# Patient Record
Sex: MALE | Race: WHITE | Employment: OTHER | URBAN - METROPOLITAN AREA
[De-identification: names, ages, dates, MRNs, and addresses within clinical notes are randomized per-mention and may not be internally consistent; named-entity substitution may affect disease eponyms.]

---

## 2017-08-03 ENCOUNTER — APPOINTMENT (OUTPATIENT)
Dept: CT IMAGING | Age: 72
DRG: 065 | End: 2017-08-03
Attending: STUDENT IN AN ORGANIZED HEALTH CARE EDUCATION/TRAINING PROGRAM
Payer: COMMERCIAL

## 2017-08-03 ENCOUNTER — HOSPITAL ENCOUNTER (INPATIENT)
Age: 72
LOS: 6 days | Discharge: NURSING, REHAB OR LONG-TERM CARE | DRG: 065 | End: 2017-08-09
Attending: EMERGENCY MEDICINE | Admitting: INTERNAL MEDICINE
Payer: COMMERCIAL

## 2017-08-03 ENCOUNTER — APPOINTMENT (OUTPATIENT)
Dept: MRI IMAGING | Age: 72
DRG: 065 | End: 2017-08-03
Attending: INTERNAL MEDICINE
Payer: COMMERCIAL

## 2017-08-03 ENCOUNTER — APPOINTMENT (OUTPATIENT)
Dept: CT IMAGING | Age: 72
DRG: 065 | End: 2017-08-03
Attending: EMERGENCY MEDICINE
Payer: COMMERCIAL

## 2017-08-03 DIAGNOSIS — R47.1 DYSARTHRIA: ICD-10-CM

## 2017-08-03 DIAGNOSIS — I63.9 ACUTE CVA (CEREBROVASCULAR ACCIDENT) (HCC): ICD-10-CM

## 2017-08-03 DIAGNOSIS — R29.810 FACIAL DROOP: ICD-10-CM

## 2017-08-03 DIAGNOSIS — I63.9 ACUTE ISCHEMIC STROKE (HCC): Primary | ICD-10-CM

## 2017-08-03 PROBLEM — I10 HYPERTENSION: Status: ACTIVE | Noted: 2017-08-03

## 2017-08-03 PROBLEM — F32.A DEPRESSION: Status: ACTIVE | Noted: 2017-08-03

## 2017-08-03 PROBLEM — G47.30 UNSPECIFIED SLEEP APNEA: Status: ACTIVE | Noted: 2017-08-03

## 2017-08-03 LAB
ANION GAP BLD CALC-SCNC: 6 MMOL/L (ref 5–15)
ATRIAL RATE: 70 BPM
BASOPHILS # BLD AUTO: 0 K/UL (ref 0–0.1)
BASOPHILS # BLD: 1 % (ref 0–1)
BUN SERPL-MCNC: 14 MG/DL (ref 6–20)
BUN/CREAT SERPL: 13 (ref 12–20)
CALCIUM SERPL-MCNC: 9 MG/DL (ref 8.5–10.1)
CALCULATED P AXIS, ECG09: 43 DEGREES
CALCULATED R AXIS, ECG10: -6 DEGREES
CALCULATED T AXIS, ECG11: 48 DEGREES
CHLORIDE SERPL-SCNC: 105 MMOL/L (ref 97–108)
CHOLEST SERPL-MCNC: 211 MG/DL
CO2 SERPL-SCNC: 31 MMOL/L (ref 21–32)
CREAT SERPL-MCNC: 1.12 MG/DL (ref 0.7–1.3)
DIAGNOSIS, 93000: NORMAL
EOSINOPHIL # BLD: 0.2 K/UL (ref 0–0.4)
EOSINOPHIL NFR BLD: 3 % (ref 0–7)
ERYTHROCYTE [DISTWIDTH] IN BLOOD BY AUTOMATED COUNT: 13.3 % (ref 11.5–14.5)
GLUCOSE BLD STRIP.AUTO-MCNC: 189 MG/DL (ref 65–100)
GLUCOSE SERPL-MCNC: 195 MG/DL (ref 65–100)
HCT VFR BLD AUTO: 43.1 % (ref 36.6–50.3)
HDLC SERPL-MCNC: 38 MG/DL
HDLC SERPL: 5.6 {RATIO} (ref 0–5)
HGB BLD-MCNC: 14.4 G/DL (ref 12.1–17)
INR BLD: 1 (ref 0.9–1.2)
LDLC SERPL CALC-MCNC: 110.2 MG/DL (ref 0–100)
LIPID PROFILE,FLP: ABNORMAL
LYMPHOCYTES # BLD AUTO: 35 % (ref 12–49)
LYMPHOCYTES # BLD: 2.1 K/UL (ref 0.8–3.5)
MCH RBC QN AUTO: 30.6 PG (ref 26–34)
MCHC RBC AUTO-ENTMCNC: 33.4 G/DL (ref 30–36.5)
MCV RBC AUTO: 91.5 FL (ref 80–99)
MONOCYTES # BLD: 0.6 K/UL (ref 0–1)
MONOCYTES NFR BLD AUTO: 9 % (ref 5–13)
NEUTS SEG # BLD: 3.2 K/UL (ref 1.8–8)
NEUTS SEG NFR BLD AUTO: 52 % (ref 32–75)
P-R INTERVAL, ECG05: 172 MS
PLATELET # BLD AUTO: 152 K/UL (ref 150–400)
POTASSIUM SERPL-SCNC: 4 MMOL/L (ref 3.5–5.1)
Q-T INTERVAL, ECG07: 404 MS
QRS DURATION, ECG06: 96 MS
QTC CALCULATION (BEZET), ECG08: 436 MS
RBC # BLD AUTO: 4.71 M/UL (ref 4.1–5.7)
SERVICE CMNT-IMP: ABNORMAL
SODIUM SERPL-SCNC: 142 MMOL/L (ref 136–145)
TRIGL SERPL-MCNC: 314 MG/DL (ref ?–150)
VENTRICULAR RATE, ECG03: 70 BPM
VLDLC SERPL CALC-MCNC: 62.8 MG/DL
WBC # BLD AUTO: 6 K/UL (ref 4.1–11.1)

## 2017-08-03 PROCEDURE — 74011250636 HC RX REV CODE- 250/636: Performed by: INTERNAL MEDICINE

## 2017-08-03 PROCEDURE — 93880 EXTRACRANIAL BILAT STUDY: CPT

## 2017-08-03 PROCEDURE — 77030032490 HC SLV COMPR SCD KNE COVD -B

## 2017-08-03 PROCEDURE — 85610 PROTHROMBIN TIME: CPT

## 2017-08-03 PROCEDURE — 74011250637 HC RX REV CODE- 250/637: Performed by: PSYCHIATRY & NEUROLOGY

## 2017-08-03 PROCEDURE — C8929 TTE W OR WO FOL WCON,DOPPLER: HCPCS

## 2017-08-03 PROCEDURE — 65660000000 HC RM CCU STEPDOWN

## 2017-08-03 PROCEDURE — 36415 COLL VENOUS BLD VENIPUNCTURE: CPT | Performed by: STUDENT IN AN ORGANIZED HEALTH CARE EDUCATION/TRAINING PROGRAM

## 2017-08-03 PROCEDURE — 99285 EMERGENCY DEPT VISIT HI MDM: CPT

## 2017-08-03 PROCEDURE — 80048 BASIC METABOLIC PNL TOTAL CA: CPT | Performed by: STUDENT IN AN ORGANIZED HEALTH CARE EDUCATION/TRAINING PROGRAM

## 2017-08-03 PROCEDURE — 74011250637 HC RX REV CODE- 250/637: Performed by: NURSE PRACTITIONER

## 2017-08-03 PROCEDURE — 70496 CT ANGIOGRAPHY HEAD: CPT

## 2017-08-03 PROCEDURE — 70450 CT HEAD/BRAIN W/O DYE: CPT

## 2017-08-03 PROCEDURE — 92610 EVALUATE SWALLOWING FUNCTION: CPT

## 2017-08-03 PROCEDURE — 70544 MR ANGIOGRAPHY HEAD W/O DYE: CPT

## 2017-08-03 PROCEDURE — 80061 LIPID PANEL: CPT | Performed by: STUDENT IN AN ORGANIZED HEALTH CARE EDUCATION/TRAINING PROGRAM

## 2017-08-03 PROCEDURE — 85025 COMPLETE CBC W/AUTO DIFF WBC: CPT | Performed by: STUDENT IN AN ORGANIZED HEALTH CARE EDUCATION/TRAINING PROGRAM

## 2017-08-03 PROCEDURE — 70551 MRI BRAIN STEM W/O DYE: CPT

## 2017-08-03 PROCEDURE — 93005 ELECTROCARDIOGRAM TRACING: CPT

## 2017-08-03 PROCEDURE — 82962 GLUCOSE BLOOD TEST: CPT

## 2017-08-03 PROCEDURE — 74011636320 HC RX REV CODE- 636/320: Performed by: RADIOLOGY

## 2017-08-03 RX ORDER — LABETALOL HYDROCHLORIDE 5 MG/ML
10 INJECTION, SOLUTION INTRAVENOUS
Status: ACTIVE | OUTPATIENT
Start: 2017-08-03 | End: 2017-08-04

## 2017-08-03 RX ORDER — NALOXONE HYDROCHLORIDE 0.4 MG/ML
0.4 INJECTION, SOLUTION INTRAMUSCULAR; INTRAVENOUS; SUBCUTANEOUS AS NEEDED
Status: DISCONTINUED | OUTPATIENT
Start: 2017-08-03 | End: 2017-08-09 | Stop reason: HOSPADM

## 2017-08-03 RX ORDER — SODIUM CHLORIDE 0.9 % (FLUSH) 0.9 %
5-10 SYRINGE (ML) INJECTION EVERY 8 HOURS
Status: DISCONTINUED | OUTPATIENT
Start: 2017-08-03 | End: 2017-08-03 | Stop reason: SDUPTHER

## 2017-08-03 RX ORDER — PRAVASTATIN SODIUM 20 MG/1
20 TABLET ORAL
Status: DISCONTINUED | OUTPATIENT
Start: 2017-08-03 | End: 2017-08-03

## 2017-08-03 RX ORDER — SODIUM CHLORIDE 0.9 % (FLUSH) 0.9 %
5-10 SYRINGE (ML) INJECTION AS NEEDED
Status: DISCONTINUED | OUTPATIENT
Start: 2017-08-03 | End: 2017-08-09 | Stop reason: HOSPADM

## 2017-08-03 RX ORDER — GUAIFENESIN 100 MG/5ML
81 LIQUID (ML) ORAL DAILY
Status: DISCONTINUED | OUTPATIENT
Start: 2017-08-03 | End: 2017-08-03

## 2017-08-03 RX ORDER — FLUTICASONE PROPIONATE 50 MCG
2 SPRAY, SUSPENSION (ML) NASAL DAILY
COMMUNITY

## 2017-08-03 RX ORDER — DM/PE/ACETAMINOPHEN/CHLORPHENR 10-5-325-2
2 TABLET, SEQUENTIAL ORAL DAILY
COMMUNITY
End: 2017-08-09

## 2017-08-03 RX ORDER — ATORVASTATIN CALCIUM 10 MG/1
20 TABLET, FILM COATED ORAL
Status: DISCONTINUED | OUTPATIENT
Start: 2017-08-03 | End: 2017-08-09 | Stop reason: HOSPADM

## 2017-08-03 RX ORDER — GUAIFENESIN 100 MG/5ML
81 LIQUID (ML) ORAL
Status: DISCONTINUED | OUTPATIENT
Start: 2017-08-03 | End: 2017-08-03

## 2017-08-03 RX ORDER — SERTRALINE HYDROCHLORIDE 50 MG/1
100 TABLET, FILM COATED ORAL DAILY
Status: DISCONTINUED | OUTPATIENT
Start: 2017-08-04 | End: 2017-08-09 | Stop reason: HOSPADM

## 2017-08-03 RX ORDER — ASPIRIN 300 MG/1
300 SUPPOSITORY RECTAL ONCE
Status: DISCONTINUED | OUTPATIENT
Start: 2017-08-03 | End: 2017-08-03

## 2017-08-03 RX ORDER — SODIUM CHLORIDE 0.9 % (FLUSH) 0.9 %
5-10 SYRINGE (ML) INJECTION EVERY 8 HOURS
Status: DISCONTINUED | OUTPATIENT
Start: 2017-08-03 | End: 2017-08-09 | Stop reason: HOSPADM

## 2017-08-03 RX ORDER — CHOLECALCIFEROL (VITAMIN D3) 125 MCG
1 CAPSULE ORAL DAILY
COMMUNITY

## 2017-08-03 RX ORDER — ENOXAPARIN SODIUM 100 MG/ML
40 INJECTION SUBCUTANEOUS EVERY 24 HOURS
Status: DISCONTINUED | OUTPATIENT
Start: 2017-08-03 | End: 2017-08-09 | Stop reason: HOSPADM

## 2017-08-03 RX ORDER — VERAPAMIL HYDROCHLORIDE 180 MG/1
180 TABLET, EXTENDED RELEASE ORAL DAILY
Status: DISCONTINUED | OUTPATIENT
Start: 2017-08-04 | End: 2017-08-04

## 2017-08-03 RX ORDER — CLOPIDOGREL BISULFATE 75 MG/1
75 TABLET ORAL DAILY
Status: DISCONTINUED | OUTPATIENT
Start: 2017-08-04 | End: 2017-08-09 | Stop reason: HOSPADM

## 2017-08-03 RX ORDER — SODIUM CHLORIDE 0.9 % (FLUSH) 0.9 %
5-10 SYRINGE (ML) INJECTION AS NEEDED
Status: DISCONTINUED | OUTPATIENT
Start: 2017-08-03 | End: 2017-08-03 | Stop reason: SDUPTHER

## 2017-08-03 RX ADMIN — Medication 10 ML: at 22:00

## 2017-08-03 RX ADMIN — ASPIRIN 81 MG 81 MG: 81 TABLET ORAL at 16:00

## 2017-08-03 RX ADMIN — ENOXAPARIN SODIUM 40 MG: 40 INJECTION SUBCUTANEOUS at 18:00

## 2017-08-03 RX ADMIN — IOPAMIDOL 95 ML: 755 INJECTION, SOLUTION INTRAVENOUS at 09:46

## 2017-08-03 RX ADMIN — ATORVASTATIN CALCIUM 20 MG: 20 TABLET, FILM COATED ORAL at 23:06

## 2017-08-03 NOTE — ED NOTES
TRANSFER - OUT REPORT:    Verbal report given to Latha(name) on Milla Rabago  being transferred to Telemetry(unit) for routine progression of care       Report consisted of patients Situation, Background, Assessment and   Recommendations(SBAR). Information from the following report(s) SBAR, Kardex, ED Summary, [facility] and Recent Results was reviewed with the receiving nurse. Lines:   Peripheral IV 08/03/17 Left Antecubital (Active)   Site Assessment Clean, dry, & intact 8/3/2017  9:42 AM   Phlebitis Assessment 0 8/3/2017  9:42 AM   Infiltration Assessment 0 8/3/2017  9:42 AM   Dressing Status Clean, dry, & intact; New 8/3/2017  9:42 AM   Dressing Type Tape;Transparent 8/3/2017  9:42 AM   Hub Color/Line Status Pink;Capped;Flushed;Patent 8/3/2017  9:42 AM       Peripheral IV 08/03/17 Right Antecubital (Active)   Site Assessment Clean, dry, & intact 8/3/2017  9:42 AM   Phlebitis Assessment 0 8/3/2017  9:42 AM   Infiltration Assessment 0 8/3/2017  9:42 AM   Dressing Status Clean, dry, & intact; New 8/3/2017  9:42 AM   Dressing Type Tape;Transparent 8/3/2017  9:42 AM   Hub Color/Line Status Pink;Capped;Flushed;Patent 8/3/2017  9:42 AM   Action Taken Blood drawn 8/3/2017  9:42 AM        Opportunity for questions and clarification was provided.       Patient transported with:   Monitor  Registered Nurse

## 2017-08-03 NOTE — PROGRESS NOTES
[facility]: MED RECONCILIATION    Comments/Recommendations:   · Verified allergies as documented. · Med rec completed with spouse. · Pharmacy used is [facility].    Medications added:     · Flonase    Medications removed:    · None    Medications adjusted:    · Directions clarified for vitamin D, glucosamine, zoloft    Information obtained from: Rx Query, Spouse, EMR    Allergies: Codeine and Codeine    Prior to Admission Medications:   Prior to Admission Medications   Prescriptions Last Dose Informant Patient Reported? Taking? PLANT STANOL WILNER (CHOLEST OFF PO) 8/3/2017 at Unknown time Significant Other Yes Yes   Sig: Take 1 Tab by mouth daily. aspirin delayed-release 81 mg tablet 8/3/2017 at Unknown time Significant Other Yes Yes   Sig: Take 81 mg by mouth daily. cholecalciferol, vitamin D3, (VITAMIN D3) 2,000 unit tab 8/3/2017 at Unknown time Significant Other Yes Yes   Sig: Take 1 Tab by mouth daily. fluticasone (FLONASE) 50 mcg/actuation nasal spray 8/3/2017 at Unknown time Significant Other Yes Yes   Si Sprays by Both Nostrils route daily. glucosamine (GLUCOSAMINE RELIEF) 1,000 mg tab 8/3/2017 at Unknown time Significant Other Yes Yes   Sig: Take 2 Tabs by mouth daily. sertraline (ZOLOFT) 100 mg tablet 8/3/2017 at Unknown time Significant Other Yes Yes   Sig: Take 100 mg by mouth daily. verapamil CR (VERELAN) 180 mg CR capsule 8/3/2017 at Unknown time Significant Other Yes Yes   Sig: Take 180 mg by mouth daily.         Facility-Administered Medications: None       Thank you,  Rajinder Crews, PharmD     Contact: [phone]

## 2017-08-03 NOTE — PROCEDURES
[address]  *** FINAL REPORT ***    Name: Roxy Redmond  MRN: [id number]    Inpatient  : 1945  HIS Order #: [id number]  [id number] [facility] Visit #: [id number]  Date: 03 Aug 2017    TYPE OF TEST: Cerebrovascular Duplex    REASON FOR TEST  Cerebrovascular accident    Right Carotid:-             Proximal               Mid                 Distal  cm/s  Systolic  Diastolic  Systolic  Diastolic  Systolic  Diastolic  CCA:     48.6       9.5        0.0                 75.8      14.4  Bulb:  ECA:     81.8       9.8  ICA:     34.5       9.1       47.5      12.8       54.1      16.5  ICA/CCA:  0.5       0.6    ICA Stenosis: <50%    Right Vertebral:-  Finding: Antegrade  Sys:       28.4  Munira:        4.9    Right Subclavian:    Left Carotid:-            Proximal                Mid                 Distal  cm/s  Systolic  Diastolic  Systolic  Diastolic  Systolic  Diastolic  CCA:     74.9      12.4                            88.8      13.7  Bulb:     0.0  ECA:    125.0      18.3  ICA:     86.6      18.3       93.0      18.3       49.9      12.8  ICA/CCA:  1.0       1.3    ICA Stenosis: <50%    Left Vertebral:-  Finding: Antegrade  Sys:       51.5  Munira:       11.1    Left Subclavian:    INTERPRETATION/FINDINGS  PROCEDURE:  Evaluation of the extracranial cerebrovascular arteries  with ultrasound (B-mode imaging, pulsed Doppler, color Doppler). Includes the common carotid, internal carotid, external carotid, and  vertebral arteries. FINDINGS: Bilateral intimal thickening noted within the CCA. Calcific  plaque noted within the bulb and  proximal internal carotid arteries. IMPRESSION: Findings are consistent with 0-49% stenosis of the right  internal carotid and 0-49% stenosis of the left internal carotid. Vertebrals are patent with antegrade flow. ADDITIONAL COMMENTS    I have personally reviewed the data relevant to the interpretation of  this  study.     TECHNOLOGIST: Maribeth Stevens RVT  Signed: 2017 12:01 PM    PHYSICIAN: Freddie Ramirez., MD  Signed: 08/04/2017 07:34 AM

## 2017-08-03 NOTE — H&P
[address]  [phone]    Hospitalist Admission Note      NAME:  Damian Jordan   :   [birth year]   MRN:  [id number]     PCP:  Dorita Musa MD     Date/Time:  8/3/2017 1:42 PM          Subjective:     CHIEF COMPLAINT: confusion, slurred speech, dysphagia    HISTORY OF PRESENT ILLNESS:     Mr. Paddy Potter is a 81 y.o. male w/ hx of HTN, TIA who presents with confusion. Woke up this morning at 6am in normal state of health. Went to [employer] and then developed sudden onset of confusion. Forgot why he was there. Found himself stumbling a bit. Drove himself home and wife found him to have slurred speech. Pt also noted to have dysphagia, with trouble swallowing both solids and liquids. Confusion is better as his speech, but feels like he still has trouble swallowing. No weakness or numbness. No facial droop. Pt was evaluated in the ED by teleneurology and decision was made to NOT use tPA per ED attending's verbal report to me. Head CT was negative. CTA H&N showed mild to moderate carotid stenosis and atherosclerotic cerebral vasculopathy. Mr. Paddy Potter is admitted for further evaluation and management of suspected CVA.       Past Medical History:   Diagnosis Date    Family history of skin cancer     Hypertension     Skin cancer     Squamous skin cancer on top of head    Stroke ([address]     TIA    Sun-damaged skin     Sunburn, blistering     Unspecified sleep apnea     cpap        Past Surgical History:   Procedure Laterality Date    HX KNEE ARTHROSCOPY Bilateral     HX ORTHOPAEDIC  2004    TRIGGER FINGER-R HAND    HX ORTHOPAEDIC      finger - left        Social History   Substance Use Topics    Smoking status: Former Smoker    Smokeless tobacco: Never Used    Alcohol use 1.5 oz/week     3 Glasses of wine per week      Comment: Rare        Family History   Problem Relation Age of Onset    Diabetes Mother     Cancer Mother LUNG    Heart Disease Father     Cancer Sister      PANCREATIC    Cancer Sister      BRAIN    Malignant Hyperthermia Neg Hx     Pseudocholinesterase Deficiency Neg Hx     Delayed Awakening Neg Hx     Post-op Nausea/Vomiting Neg Hx     Emergence Delirium Neg Hx     Post-op Cognitive Dysfunction Neg Hx     Other Neg Hx         Allergies   Allergen Reactions    Codeine Nausea Only    Codeine Nausea Only        Prior to Admission medications    Medication Sig Start Date End Date Taking? Authorizing Provider   cholecalciferol, vitamin D3, (VITAMIN D3) 2,000 unit tab Take 1 Tab by mouth daily. Yes Phys Other, MD   glucosamine (GLUCOSAMINE RELIEF) 1,000 mg tab Take 2 Tabs by mouth daily. Yes Phys Other, MD   fluticasone (FLONASE) 50 mcg/actuation nasal spray 2 Sprays by Both Nostrils route daily. Yes Historical Provider   aspirin delayed-release 81 mg tablet Take 81 mg by mouth daily. Yes Historical Provider   sertraline (ZOLOFT) 100 mg tablet Take 100 mg by mouth daily. 3/4/14  Yes Historical Provider   PLANT STANOL WILNER (CHOLEST OFF PO) Take 1 Tab by mouth daily. Yes Historical Provider   verapamil CR (VERELAN) 180 mg CR capsule Take 180 mg by mouth daily.      Yes Historical Provider       Review of Systems:  (bold if positive, if negative)    Gen:  Eyes:  ENT:  [facility]:  Pulm:  GI:    :    MS:  Skin:  Psych:  Endo:    Hem:  Renal:    Neuro:  headache          Objective:      VITALS:    Vital signs reviewed; most recent are:    Visit Vitals    BP (!) 172/96    Pulse 69    Temp 98.2 °F (36.8 °C)    Resp 25    Ht 6' 1\" (1.854 m)    Wt 125.1 kg (275 lb 12.7 oz)    SpO2 93%    BMI 36.39 kg/m2     SpO2 Readings from Last 6 Encounters:   08/03/17 93%   08/06/15 97%   06/15/15 98%   03/15/14 95%   06/05/13 95%   05/29/13 95%        No intake or output data in the 24 hours ending 08/03/17 1342         Exam:     Physical Exam:    Gen:  Well-developed, well-nourished, in no acute distress  HEENT:  No scleral icterus, PERRL, hearing intact to voice, moist mucous membranes  Neck:  Supple, without masses. Thyroid non-tender  Resp:  No accessory muscle use. CTAB without wheezing, rales, rhonchi  Card: RRR. Normal S1 and S2 without murmurs, rubs, or gallops. No peripheral lower extremity edema. No JVD. Peripheral pulses in tact. Abd:  Normoactive bowel sounds. Soft, non-tender, non-distended. No rebound, no guarding. No appreciable hepatosplenomegaly   Lymph:  No cervical adenopathy  Musc:  No cyanosis or clubbing  Skin:  No rashes or ulcers; turgor intact. Cap refill ~2 secs  Neuro:  Mild dysarthria otherwise cranial nerves 3-12 intact, no focal motor weakness with 5/5 strength BUE and BLEs, follows commands appropriately  Psych:  Good insight, normal affect. Alert, oriented x 3. Answers questions appropriately       Labs:    Recent Labs      08/03/17   0950   WBC  6.0   HGB  14.4   HCT  43.1   PLT  152     Recent Labs      08/03/17   0950   NA  142   K  4.0   CL  105   CO2  31   GLU  195*   BUN  14   CREA  1.12   CA  9.0     No components found for: GLPOC  No results for input(s): PH, PCO2, PO2, HCO3, FIO2 in the last 72 hours. Recent Labs      08/03/17   0942   INR  1.0     No results found for: SDES  No results found for: CULT  All other current labs reviewed in the computer.       Imaging/Studies:    CTA as above    EKG: NSR, LVH  EKG personally reviewed         Assessment / Plan:       81 y.o. male with hx of HTN, TIA who presents with confusion, dysarthria, and dysphagia, admitted for suspected CVA     Acute CVA (cerebrovascular accident): with presenting symptoms of confusion, dysarthria, and dysphagia, worrisome for acute CVA  -- MRI brain, TTE, carotid dopplers  -- ASA AR today, change to Plavix tomorrow (was on ASA already at home)  -- high intensity statin with atorvastatin, check lipid panel  -- send A1c to further risk stratify  -- frequent neuro checks   -- treat HTN only if extreme (SBP>220 or DBP>120 or if pt has another clear indication, e.g. AS, heart failure, aortic dissection, hypertensive encephalopathy). If treating, would slowly decrease BP by ~15% during the first 24h after stroke onset.  Can treat with IV labetalol or nicardipine  -- PT / OT / Speech evaluation   -- neurology consult      Unspecified sleep apnea (8/3/2017)  -- CPAP qhs      Hypertension: accelerated HTN, up to 220/100s in ED  -- BP goals as above  -- cont verapamil      Depression  -- cont Zoloft    Code Status: FULL, d/w wife who is a RN at [address] [place] [address]     Surrogate decision maker: wife      Previous notes and lab results reviewed, including ED notes      Total time spent with patient: 79 Minutes     Risk of deterioration: High                 Care Plan discussed with: ED provider, Patient, Family, Nursing Staff and Consultant/Specialist    Discussed:  Code Status, Care Plan and D/C Planning    Prophylaxis:  Lovenox    Disposition:  Home w/Family       ___________________________________________________    Attending Physician: Lidya Catalan MD

## 2017-08-03 NOTE — ED TRIAGE NOTES
Pt reports at 0830 this morning he starting \"having a fog\" and then having trouble speaking and trouble swallowing. CODE S called in triage.

## 2017-08-03 NOTE — ED NOTES
Pt passed initial dyspagia screening; however, now reports an increase in the difficulty managing secretions. Dr. Daphne Whitaker made aware. Will keep pt NPO at this time.

## 2017-08-03 NOTE — PROGRESS NOTES
Speech LAnguage Pathology bedside swallow evaluation  Patient: Apryl Martinez (53 y.o. male)  Date: 8/3/2017  Primary Diagnosis: Acute CVA (cerebrovascular accident) [facility])        Precautions: aspiration       ASSESSMENT :  Based on the objective data described below, the patient presents with mild oral and moderate pharyngeal dysphagia. At this moment, he is being cautious and careful with PO. Will try a very modified, light diet and f/u in am.    He was admitted with CVA with c/o speech and swallowing. Dysarthria appears to be mild flaccid dysarthria, he is independently using a slow, exaggerated rate. . HE has mild expressive aphasia at this time as well. Patient will benefit from skilled intervention to address the above impairments. Patients rehabilitation potential is considered to be Fair  Factors which may influence rehabilitation potential include:   []            None noted  [x]            Mental ability/status  [x]            Medical condition  []            Home/family situation and support systems  []            Safety awareness  []            Pain tolerance/management  []            Other:      PLAN :  Recommendations and Planned Interventions:  Full liquid diet. Eat slowly, carefully in small amounts. Frequency/Duration: Patient will be followed by speech-language pathology 5 times a week to address goals. Discharge Recommendations: Inpatient Rehab     SUBJECTIVE:   Patient stated I was having trouble swallowing a roll this am and my coffeee. -c/o oral leakage and some pharyngeal dysphagia.     OBJECTIVE:-     Past Medical History:   Diagnosis Date    Family history of skin cancer     Hypertension     Skin cancer     Squamous skin cancer on top of head    Stroke ([facility] [address] 2008    TIA    Sun-damaged skin     Sunburn, blistering     Unspecified sleep apnea     cpap     Past Surgical History:   Procedure Laterality Date    HX KNEE ARTHROSCOPY Bilateral 2011    HX ORTHOPAEDIC  2004 TRIGGER FINGER-R HAND    HX ORTHOPAEDIC      finger - left      Prior Level of Function/Home Situation:      Diet prior to admission: regular, thins  Current Diet:  NPO   Cognitive and Communication Status:  Neurologic State: Alert  Orientation Level: Oriented X4  Cognition: Follows commands (he had good safety awareness and caution about swallowing b/c he was fearful of choking; noted mild expressive aphasia with word-retireval delays, incomplete sentences at times)  Perception: Appears intact  Perseveration: No perseveration noted  Safety/Judgement: Decreased insight into deficits  Oral Assessment:  Oral Assessment  Labial: Right droop  Dentition: Natural  Oral Hygiene: WFL  Lingual: No impairment  Mandible: No impairment  P.O. Trials:  Patient Position: upright in bed  Vocal quality prior to P.O.:  (mild-moderate dysarthria, characterzied by slow, effortful swallow. intelligiblity was good, but noted mild distortion.  )  Consistency Presented: Thin liquid; Ice chips; Solid;Puree  How Presented: Self-fed/presented;Spoon;Straw;Cup/sip   ORAL PHASE:   Bolus Acceptance: Impaired (very slow and cautious)  Bolus Formation/Control:  (slow)  Type of Impairment: Mastication  Propulsion: Delayed (# of seconds) (cautious)  Oral Residue: None   PHARYNGEAL PHASE:   Initiation of Swallow: Delayed (# of seconds) (mild incoordination)  Laryngeal Elevation: Weak (moderate)  Aspiration Signs/Symptoms: Change vocal quality;Weak cough (after drinking with food in the mouth) BUT not with thin liquids by themselves. At this time, he appears capable of swallowing small sips and bites carefully. NOMS:   The NOMS functional outcome measure was used to quantify this patient's level of swallowing impairment. Based on the NOMS, the patient was determined to be at level 2 for swallow function     G Codes:   In compliance with CMSs Claims Based Outcome Reporting, the following G-code set was chosen for this patient based the use of the NOMS functional outcome to quantify this patient's level of swallowing impairment. Using the NOMS, the patient was determined to be at level 2 for swallow function which correlates with the CM= 80-99% level of severity. Based on the objective assessment provided within this note, the current, goal, and discharge g-codes are as follows:    Swallow  Swallowing:   Swallow Current Status CM= 80-99%   Swallow Goal Status CK= 40-59%        NOMS Swallowing Levels:  Level 1 (CN): NPO  Level 2 (CM): NPO but takes consistency in therapy  Level 3 (CL): Takes less than 50% of nutrition p.o. and continues with nonoral feedings; and/or safe with mod cues; and/or max diet restriction  Level 4 (CK): Safe swallow but needs mod cues; and/or mod diet restriction; and/or still requires some nonoral feeding/supplements  Level 5 (CJ): Safe swallow with min diet restriction; and/or needs min cues  Level 6 (CI): Independent with p.o.; rare cues; usually self cues; may need to avoid some foods or needs extra time  Level 7 ([address] [place] [address]): Independent for all p.o.  BAHMAN. (2003). National Outcomes Measurement System (NOMS): Adult Speech-Language Pathology User's Guide. Pain:Pain Scale 1: Numeric (0 - 10)  Pain Intensity 1: 0     After treatment:   []            Patient left in no apparent distress sitting up in chair  []            Patient left in no apparent distress in bed  []            Call bell left within reach  []            Nursing notified  []            Caregiver present  []            Bed alarm activated    COMMUNICATION/EDUCATION:   The patients plan of care including recommendations, planned interventions, and recommended diet changes were discussed with: Registered Nurse and Neuro NP. Patient was educated regarding his deficit(s) of dysphagia as this relates to His diagnosis of CVA. He demonstrated Good understanding as evidenced by discussion. Wife understood as well.  .  []            Posted safety precautions in patient's room. [x]            Patient/family have participated as able in goal setting and plan of care. [x]            Patient/family agree to work toward stated goals and plan of care. []            Patient understands intent and goals of therapy, but is neutral about his/her participation. []            Patient is unable to participate in goal setting and plan of care.     Thank you for this referral.  Charlotte Danielson SLP  Time Calculation: 15 mins

## 2017-08-03 NOTE — IP AVS SNAPSHOT
[place] [phone] Patient: Pepe Boykin MRN: [id number] You are allergic to the following Allergen Reactions Codeine Nausea Only Codeine Nausea Only Recent Documentation Height Weight BMI Smoking Status 1.854 m 128.8 kg 37.46 kg/m2 Former Smoker Emergency Contacts Name Discharge Info Relation Home Work Mobile [employer] CAREGIVER [3] Spouse [3] [phone] About your hospitalization You were admitted on:  August 3, 2017 You last received care in the:  [facility]  MED SURG 2 You were discharged on:  August 9, 2017 Unit phone number:  [phone] Why you were hospitalized Your primary diagnosis was:  Not on File Your diagnoses also included:  Acute Cva (Cerebrovascular Accident) (Hcc), Unspecified Sleep Apnea, Hypertension, Depression, Type Ii Diabetes Mellitus, Uncontrolled (Hcc) Providers Seen During Your Hospitalizations Provider Role Specialty Primary office phone Keanu Chavez MD Attending Provider Emergency Medicine [phone] Mary May MD Attending Provider Internal Medicine [phone] Dena Patrick MD Attending Provider Internal Medicine [phone] Your Primary Care Physician (PCP) Primary Care Physician Office Phone Office Fax 50 [facility] 61 [phone] Follow-up Information Follow up With Details Comments Contact Info Du Reyez MD In 5 days  [address] [facility] [address] [facility] [id number] 
[phone] Brandin Nicolas NP Go on 8/22/2017 Neurology hospital follow-up post Stroke:  Arrival time: 0930 for 10am appt [place] [address] [address] [place] [id number] 
[phone] Your Appointments Tuesday August 22, 2017 10:00 AM EDT Any with Brandin Nicolas NP  
 [address] [facility] ([address] [facility]) [place] [facility] [address] [facility] [place] [address] [phone] Current Discharge Medication List  
  
START taking these medications Dose & Instructions Dispensing Information Comments Morning Noon Evening Bedtime  
 atorvastatin 20 mg tablet Commonly known as:  LIPITOR Your last dose was: Your next dose is:    
   
   
 Dose:  20 mg Take 1 Tab by mouth nightly. Quantity:  30 Tab Refills:  0  
     
   
   
   
  
 clopidogrel 75 mg Tab Commonly known as:  PLAVIX Your last dose was: Your next dose is:    
   
   
 Dose:  75 mg Take 1 Tab by mouth daily. Quantity:  30 Tab Refills:  0  
     
   
   
   
  
 glipiZIDE SR 2.5 mg CR tablet Commonly known as:  GLUCOTROL XL Your last dose was: Your next dose is:    
   
   
 Dose:  2.5 mg Take 1 Tab by mouth Daily (before breakfast). Quantity:  30 Tab Refills:  0  
     
   
   
   
  
 hydroCHLOROthiazide 12.5 mg tablet Commonly known as:  HYDRODIURIL Your last dose was: Your next dose is:    
   
   
 Dose:  12.5 mg Take 1 Tab by mouth daily. Quantity:  30 Tab Refills:  0  
     
   
   
   
  
 insulin lispro 100 unit/mL injection Commonly known as:  HUMALOG Your last dose was: Your next dose is:    
   
   
 Sliding scale. Quantity:  1 Vial  
Refills:  0  
     
   
   
   
  
 lisinopril 20 mg tablet Commonly known as:  Agnes Drought Your last dose was: Your next dose is:    
   
   
 Dose:  20 mg Take 1 Tab by mouth daily. Quantity:  30 Tab Refills:  0  
     
   
   
   
  
 melatonin 3 mg tablet Your last dose was: Your next dose is:    
   
   
 Dose:  3 mg Take 1 Tab by mouth nightly as needed. Quantity:  30 Tab Refills:  0 CONTINUE these medications which have CHANGED Dose & Instructions Dispensing Information Comments Morning Noon Evening Bedtime * verapamil  mg CR tablet Commonly known as:  CALAN-SR What changed: You were already taking a medication with the same name, and this prescription was added. Make sure you understand how and when to take each. Your last dose was: Your next dose is:    
   
   
 Dose:  240 mg Take 1 Tab by mouth daily. Quantity:  30 Tab Refills:  0  
     
   
   
   
  
 * verapamil  mg CR capsule Commonly known as:  Carina Villegas What changed:  Another medication with the same name was added. Make sure you understand how and when to take each. Your last dose was: Your next dose is:    
   
   
 Dose:  180 mg Take 180 mg by mouth daily. Refills:  0  
     
   
   
   
  
 * Notice: This list has 2 medication(s) that are the same as other medications prescribed for you. Read the directions carefully, and ask your doctor or other care provider to review them with you. CONTINUE these medications which have NOT CHANGED Dose & Instructions Dispensing Information Comments Morning Noon Evening Bedtime  
 aspirin delayed-release 81 mg tablet Your last dose was: Your next dose is:    
   
   
 Dose:  81 mg Take 81 mg by mouth daily. Refills:  0  
     
   
   
   
  
 FLONASE 50 mcg/actuation nasal spray Generic drug:  fluticasone Your last dose was: Your next dose is:    
   
   
 Dose:  2 Spray 2 Sprays by Both Nostrils route daily. Refills:  0  
     
   
   
   
  
 sertraline 100 mg tablet Commonly known as:  ZOLOFT Your last dose was: Your next dose is:    
   
   
 Dose:  100 mg Take 100 mg by mouth daily. Refills:  0  
     
   
   
   
  
 VITAMIN D3 2,000 unit Tab Generic drug:  cholecalciferol (vitamin D3) Your last dose was: Your next dose is:    
   
   
 Dose:  1 Tab Take 1 Tab by mouth daily. Refills:  0 STOP taking these medications CHOLEST OFF PO  
   
  
 GLUCOSAMINE RELIEF 1,000 mg Tab Generic drug:  glucosamine Where to Get Your Medications Information on where to get these meds will be given to you by the nurse or doctor. ! Ask your nurse or doctor about these medications  
  atorvastatin 20 mg tablet  
 clopidogrel 75 mg Tab  
 glipiZIDE SR 2.5 mg CR tablet  
 hydroCHLOROthiazide 12.5 mg tablet  
 insulin lispro 100 unit/mL injection  
 lisinopril 20 mg tablet  
 melatonin 3 mg tablet  
 verapamil  mg CR tablet Discharge Instructions HOSPITALIST DISCHARGE INSTRUCTIONS 
NAME: Lacy Sauceda :  1945 MRN:  [id number] Date/Time:  2017 7:34 AM 
 
ADMIT DATE: 8/3/2017 DISCHARGE DATE: 2017 PRINCIPAL DISCHARGE DIAGNOSES: 
stroke MEDICATIONS: 
· It is important that you take the medication exactly as they are prescribed. Note the changes and additions to your medications. Be sure you understand these changes before you are discharged today. · Keep your medication in the bottles provided by the pharmacist and keep a list of the medication names, dosages, and times to be taken in your wallet. · Do not take other medications without consulting your doctor. Pain Management: per above medications What to do at [facility] Recommended diet:  Cardiac Diet Recommended activity: Activity as tolerated If you experience any of the following symptoms then please call your primary care physician or return to the emergency room if you cannot get hold of your doctor: 
 
severe headache, dizziness, facial droop, slurred speech, trouble swallowing, confusion, weakness/numbness, or other severe concerning symptoms that brought you to the hospital in the first place Follow Up: Follow-up Information Follow up With Details Comments Contact Info Juliana Gilliland MD In 5 days  [address] [facility] [address] [place] [address] 
[phone] [address] In 2 weeks  [place] [address] Information obtained by : 
I understand that if any problems occur once I am at home I am to contact my physician. I understand and acknowledge receipt of the instructions indicated above. Physician's or R.N.'s Signature                                                                  Date/Time Patient or Representative Signature                                                          Date/Time Discharge Orders None [employer] Announcement We are excited to announce that we are making your provider's discharge notes available to you in [employer]. You will see these notes when they are completed and signed by the physician that discharged you from your recent hospital stay. If you have any questions or concerns about any information you see in [employer], please call the Health Information Department where you were seen or reach out to your Primary Care Provider for more information about your plan of care. Introducing [facility]! Haleigh Dyer introduces [employer] patient portal. Now you can access parts of your medical record, email your doctor's office, and request medication refills online. 1. In your internet browser, go to [email or web address] 2. Click on the First Time User? Click Here link in the Sign In box. You will see the New Member Sign Up page. 3. Enter your [employer] Access Code exactly as it appears below.  You will not need to use this code after youve completed the sign-up process. If you do not sign up before the expiration date, you must request a new code. · [employer] Access Code: [email or web address] Expires: 11/2/2017  8:50 AM 
 
4. Enter the last four digits of your Social Security Number (xxxx) and Date of Birth (mm/dd/yyyy) as indicated and click Submit. You will be taken to the next sign-up page. 5. Create a [employer] ID. This will be your [employer] login ID and cannot be changed, so think of one that is secure and easy to remember. 6. Create a [employer] password. You can change your password at any time. 7. Enter your Password Reset Question and Answer. This can be used at a later time if you forget your password. 8. Enter your e-mail address. You will receive e-mail notification when new information is available in [address] 9. Click Sign Up. You can now view and download portions of your medical record. 10. Click the Download Summary menu link to download a portable copy of your medical information. If you have questions, please visit the Frequently Asked Questions section of the [employer] website. Remember, [employer] is NOT to be used for urgent needs. For medical emergencies, dial 911. Now available from your iPhone and Android! General Information Please provide this summary of care documentation to your next provider. Patient Signature:  ____________________________________________________________ Date:  ____________________________________________________________  
  
Larri Hazard Provider Signature:  ____________________________________________________________ Date:  ____________________________________________________________
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
## 2017-08-03 NOTE — ED PROVIDER NOTES
Patient is a 81 y.o. male presenting with difficulty swallowing. Dysphagia         74y M here with dysphagia and trouble swallowing. Last seen normal at 6:30a by wife. Pt reports that around 8:30a he felt like something was \"off\" - was having trouble with his speech and trouble walking. Felt like he was in a fog. Was able to drive himself home. His wife noticed that he didn't seem like himself so brought him here for evaluation. No hx of similar. Pt denies any weakness or numbness. No chest pain. No nausea or vomiting. No abdominal pain. No HA. No vision changes. Code stroke called on arrival. Pt seen and examined while on CT table. Past Medical History:   Diagnosis Date    Family history of skin cancer     Hypertension     Skin cancer     Squamous skin cancer on top of head    Stroke ([facility] [address] 2008    TIA    Sun-damaged skin     Sunburn, blistering     Unspecified sleep apnea     cpap       Past Surgical History:   Procedure Laterality Date    HX KNEE ARTHROSCOPY Bilateral 2011    HX ORTHOPAEDIC  2004    TRIGGER FINGER-R HAND    HX ORTHOPAEDIC      finger - left          Family History:   Problem Relation Age of Onset    Malignant Hyperthermia Neg Hx     Pseudocholinesterase Deficiency Neg Hx     Delayed Awakening Neg Hx     Post-op Nausea/Vomiting Neg Hx     Emergence Delirium Neg Hx     Post-op Cognitive Dysfunction Neg Hx     Other Neg Hx     Diabetes Mother     Cancer Mother      LUNG    Heart Disease Father     Cancer Sister      PANCREATIC    Cancer Sister      BRAIN       Social History     Social History    Marital status:      Spouse name: N/A    Number of children: N/A    Years of education: N/A     Occupational History    Not on file.      Social History Main Topics    Smoking status: Never Smoker    Smokeless tobacco: Never Used    Alcohol use 1.5 oz/week     3 Glasses of wine per week      Comment: Rare    Drug use: No    Sexual activity: Not on file     Other Topics Concern    Not on file     Social History Narrative    ** Merged History Encounter **              ALLERGIES: Codeine and Codeine    Review of Systems   Review of Systems   Constitutional: (-) weight loss. HEENT: (-) stiff neck   Eyes: (-) discharge. Respiratory: (-) for cough. Cardiovascular: (-) syncope. Gastrointestinal: (-) blood in stool. Genitourinary: (-) hematuria. Musculoskeletal: (-) myalgias. Neurological: (-) seizure. Skin: (-) petechiae  Lymph/Immunologic: (-) enlarged lymph nodes  All other systems reviewed and are negative. Vitals:    08/03/17 0931   Weight: 125.1 kg (275 lb 12.7 oz)            Physical Exam Nursing note and vitals reviewed. Constitutional: oriented to person, place, and time. appears well-developed and well-nourished. No distress. Head: Normocephalic and atraumatic. Sclera anicteric  Nose: No rhinorrhea  Mouth/Throat: Oropharynx is clear and moist. Pharynx normal  Eyes: Conjunctivae are normal. Pupils are equal, round, and reactive to light. Right eye exhibits no discharge. Left eye exhibits no discharge. Neck: Painless normal range of motion. Neck supple. No LAD. Cardiovascular: Normal rate, regular rhythm, normal heart sounds and intact distal pulses. Exam reveals no gallop and no friction rub. No murmur heard. Pulmonary/Chest:  No respiratory distress. No wheezes. No rales. No rhonchi. No increased work of breathing. No accessory muscle use. Good air exchange throughout. Abdominal: soft, non-tender, no rebound or guarding. No hepatosplenomegaly. Normal bowel sounds throughout. Back: no tenderness to palpation, no deformities, no CVA tenderness  Extremities/Musculoskeletal: Normal range of motion. no tenderness. No edema. Distal extremities are neurovasc intact. Lymphadenopathy:   No adenopathy.    Neurological:  CN II-XII intact aside from R sided facial droop, 5/5 strength throughout, nl sensation throughout, nl fluency, slurred speech,  no pronator drift. Normal mental status. Skin: Skin is warm and dry. No rash noted. No pallor. MDM 74y M here with stroke-like sx's. Will need CT, labs, and admission. Will d/w neuro.     ED Course       Procedures

## 2017-08-03 NOTE — ED NOTES
Dr. Dominguez Benitezr, at bedside discussing plan of care options including possibility of tPA to pt and wife.

## 2017-08-03 NOTE — PROGRESS NOTES
Spiritual Care Assessment/Progress Notes    He Guardado [id number]  [id number]    1945  81 y.o.  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   []         Not able to assess due to medical condition    Services Provided Today:  []         crisis intervention    []         reading Scriptures  []         spiritual assessment    []         prayer  [x]         empathic listening/emotional support  []         rites and rituals (cite in comments)  []         life review     []         Samaritan support  []         theological development   []         advocacy  []         ethical dialog     []         blessing  []         bereavement support    [x]         support to family  []         anticipatory grief support   []         help with AMD  []         spiritual guidance    []         meditation      Spiritual Care Needs  [x]         Emotional Support  [] Spiritual/Holiness Care  []         Loss/Adjustment  []         Advocacy/Referral                /Ethics  []         No needs expressed at               this time  []         Other: (note in               comments)  Spiritual Care Plan  []         Follow up visits with               pt/family  []         Provide materials  []         Schedule sacraments  []         Contact Community               Clergy  [x]         Follow up as needed  []         Other: (note in               comments)     Comments:  responded to Code S in ER 13. Pt was out of room at the time. Pt's wife was present by bedside.  provided initial support and stepped out for pt & tele-specialist consultation. Spiritual care will continue to follow for support.      [address] Ames M.Div, M.S, Didi Stewart1 available at 5[facility] [[email or web address]]([email or web address])

## 2017-08-03 NOTE — PROGRESS NOTES
1407: Pt arrived to unit. VSS. Remote tele monitoring. Oriented to room & educated on fall safety- pt & wife at bedside verbalized understanding. Primary Nurse Rubén Willson RN and David Barrrea RN performed a dual skin assessment on this patient. No impairment noted. Pt did have \"cupping\" on his back- ecchymosis present + small scratch to R FA. Luke score is 20. Stroke Education provided to patient and spouse/SO and the following topics were discussed    1. Patients personal risk factors for stroke are hypertension, hyperlipidemia and sleep apnea screen    2. Warning signs of Stroke:        * Sudden numbness or weakness of the face, arm or leg, especially on one side of          The body            * Sudden confusion, trouble speaking or understanding        * Sudden trouble seeing in one or both eyes        * Sudden trouble walking, dizziness, loss of balance or coordination        * Sudden severe headache with no known cause      3. Importance of activation Emergency Medical Services ( 9-1-1 ) immediately if experience any warning signs of stroke. 4. Be sure and schedule a follow-up appointment with your primary care doctor or any specialists as instructed. 5. You must take medicine every day to treat your risk factors for stroke. Be sure to take your medicines exactly as your doctor tells you: no more, no less. Know what your medicines are for , what they do. Anti-thrombotics /anticoagulants can help prevent strokes. You are taking the following medicine(s)  ASA/Lovenox     6. Smoking and second-hand smoke greatly increase your risk of stroke, cardiovascular disease and death. Smoking history never    7. Information provided was [facility] Stroke Education Hugo Guardado or Verbal Education    8. Documentation of teaching completed in Patient Education Activity and on Care Plan with teaching response noted?   yes

## 2017-08-03 NOTE — ED NOTES
ED EKG interpretation:  Rhythm: normal sinus rhythm; and regular . Rate (approx.): 70; Axis: normal; P wave: normal; QRS interval: normal ; ST/T wave: normal;  This EKG was interpreted by Gracie Hand MD,ED Provider.

## 2017-08-03 NOTE — PROGRESS NOTES
Occupational Therapy  Order received and chart reviewed, attempted to see for evaluation however currently having MRI, will follow up as able.    Jun Kerr OTR/L

## 2017-08-04 PROBLEM — E11.9 DIABETES MELLITUS TYPE 2, DIET-CONTROLLED (HCC): Status: ACTIVE | Noted: 2017-08-04

## 2017-08-04 LAB
ALBUMIN SERPL BCP-MCNC: 3.9 G/DL (ref 3.5–5)
ALBUMIN/GLOB SERPL: 1.1 {RATIO} (ref 1.1–2.2)
ALP SERPL-CCNC: 68 U/L (ref 45–117)
ALT SERPL-CCNC: 74 U/L (ref 12–78)
ANION GAP BLD CALC-SCNC: 12 MMOL/L (ref 5–15)
AST SERPL W P-5'-P-CCNC: 34 U/L (ref 15–37)
BASOPHILS # BLD AUTO: 0 K/UL (ref 0–0.1)
BASOPHILS # BLD: 1 % (ref 0–1)
BILIRUB SERPL-MCNC: 0.6 MG/DL (ref 0.2–1)
BUN SERPL-MCNC: 11 MG/DL (ref 6–20)
BUN/CREAT SERPL: 11 (ref 12–20)
CALCIUM SERPL-MCNC: 8.8 MG/DL (ref 8.5–10.1)
CHLORIDE SERPL-SCNC: 104 MMOL/L (ref 97–108)
CHOLEST SERPL-MCNC: 217 MG/DL
CO2 SERPL-SCNC: 25 MMOL/L (ref 21–32)
CREAT SERPL-MCNC: 0.99 MG/DL (ref 0.7–1.3)
EOSINOPHIL # BLD: 0.2 K/UL (ref 0–0.4)
EOSINOPHIL NFR BLD: 3 % (ref 0–7)
ERYTHROCYTE [DISTWIDTH] IN BLOOD BY AUTOMATED COUNT: 13.3 % (ref 11.5–14.5)
EST. AVERAGE GLUCOSE BLD GHB EST-MCNC: 203 MG/DL
GLOBULIN SER CALC-MCNC: 3.5 G/DL (ref 2–4)
GLUCOSE BLD STRIP.AUTO-MCNC: 147 MG/DL (ref 65–100)
GLUCOSE BLD STRIP.AUTO-MCNC: 169 MG/DL (ref 65–100)
GLUCOSE BLD STRIP.AUTO-MCNC: 179 MG/DL (ref 65–100)
GLUCOSE SERPL-MCNC: 163 MG/DL (ref 65–100)
HBA1C MFR BLD: 8.7 % (ref 4.2–6.3)
HCT VFR BLD AUTO: 44.2 % (ref 36.6–50.3)
HDLC SERPL-MCNC: 41 MG/DL
HDLC SERPL: 5.3 {RATIO} (ref 0–5)
HGB BLD-MCNC: 14.8 G/DL (ref 12.1–17)
LDLC SERPL CALC-MCNC: 128.6 MG/DL (ref 0–100)
LIPID PROFILE,FLP: ABNORMAL
LYMPHOCYTES # BLD AUTO: 37 % (ref 12–49)
LYMPHOCYTES # BLD: 2.3 K/UL (ref 0.8–3.5)
MAGNESIUM SERPL-MCNC: 2.4 MG/DL (ref 1.6–2.4)
MCH RBC QN AUTO: 30.2 PG (ref 26–34)
MCHC RBC AUTO-ENTMCNC: 33.5 G/DL (ref 30–36.5)
MCV RBC AUTO: 90.2 FL (ref 80–99)
MONOCYTES # BLD: 0.5 K/UL (ref 0–1)
MONOCYTES NFR BLD AUTO: 8 % (ref 5–13)
NEUTS SEG # BLD: 3.2 K/UL (ref 1.8–8)
NEUTS SEG NFR BLD AUTO: 51 % (ref 32–75)
PLATELET # BLD AUTO: 155 K/UL (ref 150–400)
POTASSIUM SERPL-SCNC: 3.6 MMOL/L (ref 3.5–5.1)
PROT SERPL-MCNC: 7.4 G/DL (ref 6.4–8.2)
RBC # BLD AUTO: 4.9 M/UL (ref 4.1–5.7)
SERVICE CMNT-IMP: ABNORMAL
SODIUM SERPL-SCNC: 141 MMOL/L (ref 136–145)
TRIGL SERPL-MCNC: 237 MG/DL (ref ?–150)
VLDLC SERPL CALC-MCNC: 47.4 MG/DL
WBC # BLD AUTO: 6.3 K/UL (ref 4.1–11.1)

## 2017-08-04 PROCEDURE — 82962 GLUCOSE BLOOD TEST: CPT

## 2017-08-04 PROCEDURE — 74011636637 HC RX REV CODE- 636/637: Performed by: INTERNAL MEDICINE

## 2017-08-04 PROCEDURE — 97162 PT EVAL MOD COMPLEX 30 MIN: CPT

## 2017-08-04 PROCEDURE — 74011250637 HC RX REV CODE- 250/637: Performed by: INTERNAL MEDICINE

## 2017-08-04 PROCEDURE — 83036 HEMOGLOBIN GLYCOSYLATED A1C: CPT | Performed by: INTERNAL MEDICINE

## 2017-08-04 PROCEDURE — 80061 LIPID PANEL: CPT | Performed by: INTERNAL MEDICINE

## 2017-08-04 PROCEDURE — 97112 NEUROMUSCULAR REEDUCATION: CPT

## 2017-08-04 PROCEDURE — 74011250637 HC RX REV CODE- 250/637: Performed by: PSYCHIATRY & NEUROLOGY

## 2017-08-04 PROCEDURE — 85025 COMPLETE CBC W/AUTO DIFF WBC: CPT | Performed by: INTERNAL MEDICINE

## 2017-08-04 PROCEDURE — 92526 ORAL FUNCTION THERAPY: CPT | Performed by: SPEECH-LANGUAGE PATHOLOGIST

## 2017-08-04 PROCEDURE — 83735 ASSAY OF MAGNESIUM: CPT | Performed by: INTERNAL MEDICINE

## 2017-08-04 PROCEDURE — 65660000000 HC RM CCU STEPDOWN

## 2017-08-04 PROCEDURE — 74011250636 HC RX REV CODE- 250/636: Performed by: INTERNAL MEDICINE

## 2017-08-04 PROCEDURE — 97535 SELF CARE MNGMENT TRAINING: CPT

## 2017-08-04 PROCEDURE — 97530 THERAPEUTIC ACTIVITIES: CPT

## 2017-08-04 PROCEDURE — 97165 OT EVAL LOW COMPLEX 30 MIN: CPT

## 2017-08-04 PROCEDURE — 80053 COMPREHEN METABOLIC PANEL: CPT | Performed by: INTERNAL MEDICINE

## 2017-08-04 PROCEDURE — 36415 COLL VENOUS BLD VENIPUNCTURE: CPT | Performed by: INTERNAL MEDICINE

## 2017-08-04 RX ORDER — HYDRALAZINE HYDROCHLORIDE 20 MG/ML
10 INJECTION INTRAMUSCULAR; INTRAVENOUS
Status: DISCONTINUED | OUTPATIENT
Start: 2017-08-04 | End: 2017-08-09 | Stop reason: HOSPADM

## 2017-08-04 RX ORDER — VERAPAMIL HYDROCHLORIDE 240 MG/1
240 TABLET, FILM COATED, EXTENDED RELEASE ORAL DAILY
Status: DISCONTINUED | OUTPATIENT
Start: 2017-08-05 | End: 2017-08-09 | Stop reason: HOSPADM

## 2017-08-04 RX ORDER — ATORVASTATIN CALCIUM 20 MG/1
20 TABLET, FILM COATED ORAL
Qty: 30 TAB | Refills: 0 | Status: SHIPPED | OUTPATIENT
Start: 2017-08-04 | End: 2017-08-09

## 2017-08-04 RX ORDER — HYDROCHLOROTHIAZIDE 25 MG/1
12.5 TABLET ORAL DAILY
Status: DISCONTINUED | OUTPATIENT
Start: 2017-08-04 | End: 2017-08-09 | Stop reason: HOSPADM

## 2017-08-04 RX ORDER — GLIPIZIDE 2.5 MG/1
2.5 TABLET, EXTENDED RELEASE ORAL
Status: DISCONTINUED | OUTPATIENT
Start: 2017-08-04 | End: 2017-08-09 | Stop reason: HOSPADM

## 2017-08-04 RX ORDER — INSULIN LISPRO 100 [IU]/ML
INJECTION, SOLUTION INTRAVENOUS; SUBCUTANEOUS
Status: DISCONTINUED | OUTPATIENT
Start: 2017-08-04 | End: 2017-08-09 | Stop reason: HOSPADM

## 2017-08-04 RX ORDER — HYDROCHLOROTHIAZIDE 12.5 MG/1
12.5 TABLET ORAL DAILY
Qty: 30 TAB | Refills: 0 | Status: SHIPPED | OUTPATIENT
Start: 2017-08-04 | End: 2017-08-09

## 2017-08-04 RX ORDER — CLOPIDOGREL BISULFATE 75 MG/1
75 TABLET ORAL DAILY
Qty: 30 TAB | Refills: 0 | Status: SHIPPED | OUTPATIENT
Start: 2017-08-04 | End: 2017-08-09

## 2017-08-04 RX ORDER — LISINOPRIL 5 MG/1
10 TABLET ORAL DAILY
Status: DISCONTINUED | OUTPATIENT
Start: 2017-08-04 | End: 2017-08-05

## 2017-08-04 RX ORDER — LANOLIN ALCOHOL/MO/W.PET/CERES
3 CREAM (GRAM) TOPICAL
Status: DISCONTINUED | OUTPATIENT
Start: 2017-08-04 | End: 2017-08-09 | Stop reason: HOSPADM

## 2017-08-04 RX ORDER — DEXTROSE 50 % IN WATER (D50W) INTRAVENOUS SYRINGE
12.5-25 AS NEEDED
Status: DISCONTINUED | OUTPATIENT
Start: 2017-08-04 | End: 2017-08-09 | Stop reason: HOSPADM

## 2017-08-04 RX ORDER — MAGNESIUM SULFATE 100 %
4 CRYSTALS MISCELLANEOUS AS NEEDED
Status: DISCONTINUED | OUTPATIENT
Start: 2017-08-04 | End: 2017-08-09 | Stop reason: HOSPADM

## 2017-08-04 RX ADMIN — INSULIN LISPRO 2 UNITS: 100 INJECTION, SOLUTION INTRAVENOUS; SUBCUTANEOUS at 11:47

## 2017-08-04 RX ADMIN — LISINOPRIL 10 MG: 5 TABLET ORAL at 15:31

## 2017-08-04 RX ADMIN — SERTRALINE 100 MG: 50 TABLET, FILM COATED ORAL at 09:57

## 2017-08-04 RX ADMIN — ATORVASTATIN CALCIUM 20 MG: 20 TABLET, FILM COATED ORAL at 20:33

## 2017-08-04 RX ADMIN — Medication 10 ML: at 15:32

## 2017-08-04 RX ADMIN — GLIPIZIDE 2.5 MG: 2.5 TABLET, FILM COATED, EXTENDED RELEASE ORAL at 11:31

## 2017-08-04 RX ADMIN — CLOPIDOGREL 75 MG: 75 TABLET, FILM COATED ORAL at 09:57

## 2017-08-04 RX ADMIN — INSULIN LISPRO 2 UNITS: 100 INJECTION, SOLUTION INTRAVENOUS; SUBCUTANEOUS at 17:44

## 2017-08-04 RX ADMIN — HYDROCHLOROTHIAZIDE 12.5 MG: 25 TABLET ORAL at 09:57

## 2017-08-04 RX ADMIN — MELATONIN TAB 3 MG 3 MG: 3 TAB at 20:34

## 2017-08-04 RX ADMIN — Medication 10 ML: at 06:00

## 2017-08-04 RX ADMIN — ENOXAPARIN SODIUM 40 MG: 40 INJECTION SUBCUTANEOUS at 17:44

## 2017-08-04 RX ADMIN — Medication 10 ML: at 20:37

## 2017-08-04 RX ADMIN — VERAPAMIL HYDROCHLORIDE 180 MG: 180 TABLET, FILM COATED, EXTENDED RELEASE ORAL at 10:37

## 2017-08-04 NOTE — PROGRESS NOTES
Bedside and Verbal shift change report given to [address] [place] [address] 21 (oncoming nurse) by Pedro Lyle RN (offgoing nurse). Report included the following information SBAR, Kardex, ED Summary, Procedure Summary, Intake/Output, MAR, Accordion, Recent Results, Med Rec Status and Cardiac Rhythm . Tatianna Luke

## 2017-08-04 NOTE — PROGRESS NOTES
LILI SECOURS: [address] [facility] Neurology  [address] [place] [address] Amanda Wilkerson [facility] [address]  [phone]  CARLOS Melchor    * Inpatient Progress Note *    NAME:  Jodi Bustillo   :    MRN:  [id number]  PCP:    Juliana Gilliland MD  ______________________________________________________________________  *Labs, studies, notes and hospital records were reviewed. -KANDI Koenig  ______________________________________________________________________  Discussion with patient regarding :  ·  BEFAST, location of CVA and potential impact post CVA, recovery post CVA   ·  Medications:  Pt willing to take statin now with knowing he has high LDL > goal and continue Plavix  · Secondary CVA Risk Factor Prevention with:  CUCO continue CPAP, HTN, weight loss/exercise, HLD and DMT2  ·  will have OP neurology appt in clinic within 2-3 weeks with NP post   · OP neuro appt scheduled in clinic    · Any questions per patient and wife were answered    Will sign off  Stable per Neurology  ____________________________________________________________________    Collaborating care team physician, Dr. Erlinda Zarco  ____________________________________________________________________    CARLOS Melchor-BC

## 2017-08-04 NOTE — PROGRESS NOTES
Bedside and Verbal shift change report given to Jona Rojas (oncoming nurse) by Jorge Connor (offgoing nurse). Report included the following information SBAR, Kardex, Procedure Summary, Intake/Output, MAR, Accordion, Recent Results, Med Rec Status and Cardiac Rhythm normal sinus rhythm with a bundle branch block. .     10:40 AM  Patient was able to wash his face and brush his teeth with the aid of his wife. 11:32 AM  Glipizide being given late due to timing of coming up from pharmacy, not stocked in pyxis.

## 2017-08-04 NOTE — PROGRESS NOTES
8/4/2017   CARE MANAGEMENT NOTE:  CM is following pt for initial discharge planning. EMR reviewed. CM met with pt who was his own historian for this needs assessment. Reportedly, pt resides with his wife, Franco Pruitt in a one story home with two entry steps. PTA, pt was ambulatory, indepn with ADLs, and drives. He uses [facility] on [address].  Pt does not have home healthcare currently. DME - has a Cpap. PCP is Dr. Tor Llamas whom he last saw in Nov 2016. Plan is to return home when medically stable. If home health is ordered he has chosen [facility] agency. Consult has been ordered for [facility] to brittney Chatterjee

## 2017-08-04 NOTE — ROUTINE PROCESS
Bedside and Verbal shift change report given to Kole Platt RN (oncoming nurse) by Trini Dubon RN (offgoing nurse). Report included the following information SBAR, Kardex, Intake/Output, MAR, Accordion and Recent Results.

## 2017-08-04 NOTE — PROGRESS NOTES
Problem: Mobility Impaired (Adult and Pediatric)  Goal: *Acute Goals and Plan of Care (Insert Text)  Physical Therapy Goals  Initiated 8/4/2017  1. Patient will move from supine to sit and sit to supine in bed with minimal assistance/contact guard assist within 7 day(s). 2. Patient will transfer from bed to chair and chair to bed with minimal assistance/contact guard assist using the least restrictive device within 7 day(s). 3. Patient will perform sit to stand with minimal assistance/contact guard assist within 7 day(s). 4. Patient will ambulate with minimal assistance/contact guard assist for 50 feet with the least restrictive device within 7 day(s). 5. Patient will ascend/descend 4 stairs with 1 handrail(s) with minimal assistance/contact guard assist within 7 day(s). 6. Patient will improve Stinson Balance score by 7 points within 7 days. PHYSICAL THERAPY EVALUATION- NEURO POPULATION     Patient: Lashon Edmondson (82 y.o. male)  Date: 8/4/2017  Primary Diagnosis: Acute CVA (cerebrovascular accident) [facility])        Precautions:   WBAT, Fall      ASSESSMENT :  Based on the objective data described below, the patient presents with decreased motor planning and sequencing, Right strength, bradykinetic movements, increased time for processing, decreased balance and overall functional mobility. Patient presented to the ER with slurred speech and dysphagia, he declined tPA. MRI confirmed a Left basal ganglia CVA. Prior to admission he was active, retired, but still driving. He did not require an assistive device and lives with his wife in a 1 story home. Patient today with LE strength deficits, see below. Very slow movements, increased time for completion for functional activities. He required MOD A for all dynamic standing tasks. He fatigues quickly and demonstrates increased right lateral lean, increased knee flexion (without buckling) and increased slurred speech.   He has decreased dual tasking ability due to slow processing. Patient with elevated blood pressure- 202/110. He has been premedicated and currently unable to receive any further due to permissive HTN for 24 hours. He has deficits with all 3 disciplines: PT,OT,SLP. Patient would most benefit from skilled PT to address the above listed deficits and to maximize their functional independence with treatment interventions focused on neuroplasticity with increased repetition, intensity, and specificity to deficits. At discharge for continuing carryover of these principles and for maximal recovery of deficits recommend inpatient rehab. Patient was educated on all signs and symptoms of CVA including BE FAST and to call for EMS if symptoms present. .     Patient will benefit from skilled intervention to address the above impairments. Patients rehabilitation potential is considered to be Good  Factors which may influence rehabilitation potential include:   [ ]           None noted  [ ]           Mental ability/status  [X]           Medical condition  [ ]           Home/family situation and support systems  [ ]           Safety awareness  [ ]           Pain tolerance/management  [ ]           Other:        PLAN :  Recommendations and Planned Interventions:  [X]             Bed Mobility Training             [X]      Neuromuscular Re-Education  [X]             Transfer Training                   [ ]      Orthotic/Prosthetic Training  [X]             Gait Training                         [X]      Modalities  [X]             Therapeutic Exercises           [ ]      Edema Management/Control  [X]             Therapeutic Activities            [X]      Patient and Family Training/Education  [ ]             Other (comment):  Frequency/Duration: Patient will be followed by physical therapy 5 times a week to address goals.   Discharge Recommendations: Inpatient Rehab  Further Equipment Recommendations for Discharge: TBD       SUBJECTIVE:   Patient stated i see what you are saying, that sounds good.       OBJECTIVE DATA SUMMARY:   HISTORY:    Past Medical History:   Diagnosis Date    Family history of skin cancer      Hypertension      Skin cancer       Squamous skin cancer on top of head    Stroke ([facility] [address] 2008     TIA    Sun-damaged skin      Sunburn, blistering      Unspecified sleep apnea       cpap     Past Surgical History:   Procedure Laterality Date    HX KNEE ARTHROSCOPY Bilateral 2011    HX ORTHOPAEDIC   2004     TRIGGER FINGER-R HAND    HX ORTHOPAEDIC         finger - left      Prior Level of Function/Home Situation: see above  Personal factors and/or comorbidities impacting plan of care:      Home Situation  Home Environment: Private residence  # Steps to Enter: 3  Rails to Enter: Yes  One/Two Story Residence: One story  Living Alone: No  Support Systems: Spouse/Significant Other/Partner  Patient Expects to be Discharged to[de-identified] Private residence  Current DME Used/Available at Home: Walker, rolling     EXAMINATION/PRESENTATION/DECISION MAKING:   Critical Behavior:  Neurologic State: Alert  Orientation Level: Oriented X4  Cognition: Follows commands  Safety/Judgement: Awareness of environment  Hearing:   Auditory  Auditory Impairment: None  Skin:  All exposed intact  Edema: none noted  Range Of Motion:  AROM: Grossly decreased, non-functional           PROM: Within functional limits           Strength:    Strength: Grossly decreased, non-functional        RLE Strength  R Hip Flexion: 2-  R Knee Flexion: 2  R Knee Extension: 2-  R Ankle Dorsiflexion: 3-  R Ankle Plantar Flexion: 3        LLE Strength  L Hip Flexion: 4  L Knee Flexion: 4  L Knee Extension: 4  L Ankle Dorsiflexion: 4+  L Ankle Plantar Flexion: 5  Tone & Sensation:   Tone: Normal              Sensation: Intact               Coordination:  Coordination: Generally decreased, functional  Vision:      Functional Mobility:  Bed Mobility:     Supine to Sit: Maximum assistance        Transfers:  Sit to Stand: Moderate assistance  Stand to Sit: Moderate assistance        Bed to Chair: Moderate assistance              Balance:   Sitting: Intact  Standing: Impaired  Standing - Static: Poor  Standing - Dynamic : Poor  Ambulation/Gait Training:  Distance (ft): 15 Feet (ft)  Assistive Device: Gait belt  Ambulation - Level of Assistance: Moderate assistance     Gait Description (WDL): Exceptions to WDL  Gait Abnormalities: Step to gait; Path deviations;Decreased step clearance                                                 Functional Measure  Vazquez Balance Test:      Sitting to Standin  Standing Unsupported: 3  Sitting with Back Unsupported: 4  Standing to Sitting: 3  Transfers: 1  Standing Unsupported with Eyes Closed: 3  Standing Unsupported with Feet Together: 1  Reach Forward with Outstretched Arm: 1   Object: 1  Turn to Look Over Shoulders: 1  Turn 360 Degrees: 0  Alternate Foot on Step/Stool: 0  Standing Unsupported One Foot in Front: 0  Stand on One Le  Total: 20             56=Maximum possible score;   0-20=High fall risk  21-40=Moderate fall risk   41-56=Low fall risk      Vazquez Balance Test and G-code impairment scale:  Percentage of Impairment CH     0%    CI     1-19% CJ     20-39% CK     40-59% CL     60-79% CM     80-99% CN      100%   Vazquez   Score 0-56 56 45-55 34-44 23-33 12-22 1-11 0         G codes: In compliance with CMSs Claims Based Outcome Reporting, the following G-code set was chosen for this patient based on their primary functional limitation being treated: The outcome measure chosen to determine the severity of the functional limitation was the VAZQUEZ with a score of 20/56 which was correlated with the impairment scale.       · Mobility - Walking and Moving Around:               - CURRENT STATUS:    CL - 60%-79% impaired, limited or restricted               - GOAL STATUS:           CK - 40%-59% impaired, limited or restricted               - D/C STATUS: ---------------To be determined---------------       Physical Therapy Evaluation Charge Determination   History Examination Presentation Decision-Making   HIGH Complexity :3+ comorbidities / personal factors will impact the outcome/ POC  HIGH Complexity : 4+ Standardized tests and measures addressing body structure, function, activity limitation and / or participation in recreation  MEDIUM Complexity : Evolving with changing characteristics  Other outcome measures VAZQUEZ  MEDIUM      Based on the above components, the patient evaluation is determined to be of the following complexity level: MEDIUM     Therapeutic Exercises:      Pain:  Pain Scale 1: Numeric (0 - 10)  Pain Intensity 1: 0              Activity Tolerance:   Fair- elevated blood pressure  Please refer to the flowsheet for vital signs taken during this treatment. After treatment:   [ ]     Patient left in no apparent distress sitting up in chair  [X]     Patient left in no apparent distress in bed  [X]     Call bell left within reach  [X]     Nursing notified  [ ]     Caregiver present  [X]     Bed alarm activated      COMMUNICATION/EDUCATION:   The patients plan of care was discussed with: Registered Nurse. Patient was educated regarding His deficit(s) of motor planning, sequencing, balance as this relates to His diagnosis of CVA. He demonstrated Good understanding as evidenced by verbal feedback. Patient and/or family was verbally educated on the BE FAST acronym for signs/symptoms of CVA and TIA. Informed patient to refer to the Stroke Binder for further BE FAST information. All questions answered with patient indicating good understanding.      [X]  Fall prevention education was provided and the patient/caregiver indicated understanding. [X]  Patient/family have participated as able in goal setting and plan of care. [X]  Patient/family agree to work toward stated goals and plan of care.   [ ]  Patient understands intent and goals of therapy, but is neutral about his/her participation. [ ]  Patient is unable to participate in goal setting and plan of care.      Thank you for this referral.  Digna , PT, DPT   Time Calculation: 31 mins

## 2017-08-04 NOTE — DISCHARGE INSTRUCTIONS
HOSPITALIST DISCHARGE INSTRUCTIONS  NAME: Chani Dunn   :     MRN:  [id number]     Date/Time:  2017 7:34 AM    ADMIT DATE: 8/3/2017     DISCHARGE DATE: 2017     PRINCIPAL DISCHARGE DIAGNOSES:  stroke    MEDICATIONS:  · It is important that you take the medication exactly as they are prescribed. Note the changes and additions to your medications. Be sure you understand these changes before you are discharged today. · Keep your medication in the bottles provided by the pharmacist and keep a list of the medication names, dosages, and times to be taken in your wallet. · Do not take other medications without consulting your doctor. Pain Management: per above medications    What to do at Home    Recommended diet:  Cardiac Diet    Recommended activity: Activity as tolerated    If you experience any of the following symptoms then please call your primary care physician or return to the emergency room if you cannot get hold of your doctor:    severe headache, dizziness, facial droop, slurred speech, trouble swallowing, confusion, weakness/numbness, or other severe concerning symptoms that brought you to the hospital in the first place    Follow Up: Follow-up Information     Follow up With Details Comments Ingris Infante MD In 5 days  [address]  [address] In 2 weeks  [address] [address]              Information obtained by :  I understand that if any problems occur once I am at home I am to contact my physician. I understand and acknowledge receipt of the instructions indicated above.                                                                                                                                            Physician's or R.N.'s Signature                                                                  Date/Time Patient or Representative Signature                                                          Date/Time

## 2017-08-04 NOTE — PROGRESS NOTES
Problem: Dysphagia (Adult)  Goal: *Acute Goals and Plan of Care (Insert Text)  Initiated 8/4/17:  1. Patient will tolerate Dysphagia 2 diet with thin liquids without s/s aspiration by 8/11/17. 2. Patient will tolerate trials of mechanical soft solids with SLP only without s/s aspiration and with adequate oral clearance by 8/11/17. 3. Patient will utilize speech and swallowing strategies with 90% accuracy given min cues by 8/11/17.

## 2017-08-04 NOTE — PROGRESS NOTES
Manjeet Becker [facility]  [address] [facility], [place], [address] [place] [address]  [phone]      Medical Progress Note      NAME: He Guardado   :    MRM:  [id number]    Date/Time: 2017          Subjective:     Chief Complaint:  F/u CVA    Chart/notes/labs/studies reviewed, patient examined at bedside. Feels fine. R facial droop. R-sided weakness, mild. Objective:       Vitals:          Last 24hrs VS reviewed since prior progress note. Most recent are:    Visit Vitals    BP (!) 201/103 (BP 1 Location: Right arm, BP Patient Position: Sitting)    Pulse 72    Temp 99 °F (37.2 °C)    Resp 20    Ht 6' 1\" (1.854 m)    Wt 128.8 kg (283 lb 15.2 oz)    SpO2 93%    BMI 37.46 kg/m2     SpO2 Readings from Last 6 Encounters:   17 93%   08/06/15 97%   06/15/15 98%   03/15/14 95%   13 95%   13 95%          Intake/Output Summary (Last 24 hours) at 17 0923  Last data filed at 17 0740   Gross per 24 hour   Intake                0 ml   Output              350 ml   Net             -350 ml          Exam:     Physical Exam:    Gen:  Well-developed, well-nourished, in no acute distress  HEENT:  Sclerae nonicteric, hearing intact to voice, mucous membranes moist  Neck:  Supple, without masses. Resp:  No accessory muscle use, CTAB without wheezes, rales, or rhonchi  Card: RRR, without m/r/g. No LE edema. Abd:  +bowel sounds, soft, NTTP, nondistended. No HSM. Neuro: R facial droop. 4/5 strength R-UE and RLE strength. Mild dysarthria  Psych:  Alert, oriented x 3.  Good insight     Medications Reviewed: (see below)    Lab Data Reviewed: (see below)    ______________________________________________________________________    Medications:     Current Facility-Administered Medications   Medication Dose Route Frequency    hydroCHLOROthiazide (HYDRODIURIL) tablet 12.5 mg  12.5 mg Oral DAILY    sodium chloride (NS) flush 5-10 mL  5-10 mL IntraVENous Q8H    sodium chloride (NS) flush 5-10 mL  5-10 mL IntraVENous PRN    naloxone (NARCAN) injection 0.4 mg  0.4 mg IntraVENous PRN    labetalol (NORMODYNE;TRANDATE) injection 10 mg  10 mg IntraVENous Q4H PRN    sertraline (ZOLOFT) tablet 100 mg  100 mg Oral DAILY    verapamil ER (CALAN-SR) tablet 180 mg  180 mg Oral DAILY    clopidogrel (PLAVIX) tablet 75 mg  75 mg Oral DAILY    enoxaparin (LOVENOX) injection 40 mg  40 mg SubCUTAneous Q24H    atorvastatin (LIPITOR) tablet 20 mg  20 mg Oral QHS            Lab Review:     Recent Labs      08/04/17 0053 08/03/17   0950   WBC  6.3  6.0   HGB  14.8  14.4   HCT  44.2  43.1   PLT  155  152     Recent Labs      08/04/17 0053 08/03/17   0950  08/03/17   0942   NA  141  142   --    K  3.6  4.0   --    CL  104  105   --    CO2  25  31   --    GLU  163*  195*   --    BUN  11  14   --    CREA  0.99  1.12   --    CA  8.8  9.0   --    MG  2.4   --    --    ALB  3.9   --    --    SGOT  34   --    --    ALT  74   --    --    INR   --    --   1.0     No components found for: GLPOC  No results for input(s): PH, PCO2, PO2, HCO3, FIO2 in the last 72 hours. Recent Labs      08/03/17   0942   INR  1.0     No results found for: SDES  No results found for: CULT         Assessment / Plan:     82 yo WM w/ hx of HTN, CUCO, depression admitted for acute CVA      Acute CVA (cerebrovascular accident) ([facility] [address] (8/3/2017): with now more apparent R facial droop and R-sided weakness. MRI brain showed Small areas of acute infarction in the left superior periventricular whitematter extending into the left posterior lentiform nucleus. Mild changes of chronic small vessel ischemic disease. MRA of the brain without evidence for acute large vessel arterial occlusion or significant stenosis. Mild intracranial atherosclerosis. CTA showed mild to moderate stenosis in the internal carotid artery on the left. Carotid dopplers unremarkable.  TTE w/o shunt, preserved EF  -- started on Plavix  -- discussed with wife re: use of statin (pt was told he had fatty liver). Benefit outweighs theoretical risk of liver injury. Follow liver enzymes on statin  -- cont PT / OT / speech evaluation  -- BP and DM control    Type II diabetes mellitus, uncontrolled: this is an apparent new diagnosis. A1c 8.7%  -- would benefit from metformin but hold off until 48 hours post contrast  -- start glipizide, low dose, titrate up      Hypertension (8/3/2017): BP high  -- permissive HTN yesterday, can be more aggressive today.  Start HCTZ, titrate  -- cont verapamil      Unspecified sleep apnea:   -- cont CPAP      Depression (8/3/2017)  -- cont Zoloft      Obesity:   -- would benefit from weight loss and dietary / lifestyle modifications      Total time spent with patient: 35 minutes, discussed at length with wife                 Care Plan discussed with: Patient, Nursing Staff and >50% of time spent in counseling and coordination of care    Discussed:  Care Plan and D/C Planning    Prophylaxis:  Lovenox    Disposition:  [facility]/Rehab           ___________________________________________________    Attending Physician: Rashaun Ni MD

## 2017-08-04 NOTE — CDMP QUERY
Patient is noted to have a BMI of 37.46 kg  Please clarify if this patient is:     =>Morbidly obese (BMI > 40)  =>Obese (BMI 30 - 39.9)  =>Overweight (BMI 25 - 29.9)  =>Other explanation of clinical findings  =>Unable to determine (no explanation for clinical findings)    Presentation: 6'1\" 283 lbs = BMI 37.46 kg    REFERENCE:  The [address] [place] [address] has issued a statement indicating that, \"Individuals who are overweight, obese, or morbidly obese are at an increased risk for certain medical conditions when compared to persons of normal weight. Therefore, these conditions are always clinically significant and reportable when documented by the provider. Please clarify and document your clinical opinion in the progress notes and discharge summary, including the definitive and or presumptive diagnosis, (suspected or probable), related to the above clinical findings. Please include clinical findings supporting your diagnosis.      Thank you,  John Harris, MSN, [place] [address]

## 2017-08-04 NOTE — PROGRESS NOTES
Speech language pathology treatment  Patient: Daniel Henriquez (88 y.o. male)  Date: 8/4/2017  Diagnosis: Acute CVA (cerebrovascular accident) [facility]) <principal problem not specified>         ASSESSMENT:  Patient with improved swallow function per bedside assessment this date. Patient presents with mild oropharyngeal dysphagia characterized by mildly prolonged mastication with soft chopped solids and coughing on thin liquids taken by straw only. Patient exhibited trace residue following ~50% of boluses but he was aware of this and cleared independently. Patient continues to exhibit a mild dysarthria which did not affect intelligibility during therapy session. He also exhibited a mild word finding deficit and decreased length of utterance. Patient was able to demonstrate use of speech strategies as well as word finding strategies with min cues. RECOMMENDATIONS: Upgrade to Dysphagia 2 diet with thin liquids. Safe swallowing recommendations have been posted in room and include:   1-90 degrees during and for 30 minutes after PO.  2-small bites and sips  3-no straws      Progression toward goals:  [x]       Improving appropriately and progressing toward goals  []       Improving slowly and progressing toward goals  []       Not making progress toward goals and plan of care will be adjusted     PLAN:  Patient continues to benefit from skilled intervention to address the above impairments. Continue treatment per established plan of care. Discharge Recommendations: To Be Determined     SUBJECTIVE:   Patient stated [facility] speech and swallowing are better today. OBJECTIVE:   Mental Status:  Neurologic State: Alert  Orientation Level: Oriented X4  Cognition: Follows commands  Perception: Appears intact  Perseveration: No perseveration noted  Safety/Judgement: Awareness of environment  Treatment & Interventions:   Motor Speech: Patient was educated on the following speech strategies to improve intelligibility: slow, loud, clear, and over articulate. Patient demonstrated use of these strategies with min cues during therapy. In a quiet environment, speech intelligibility was 100%. Language Comprehension and Expression:     Verbal Expression: Mild word finding: patient was taught to describe attributes and functions of objects when unable to recall the name of something. He demonstrated this successfully in therapy. Patient spoke mostly in phrases but was able to increase length of utterance with min cues. Neuro-Linguistics: not formally addressed but patient reported difficulty with maintaining attention to task. During therapy, patient's attention to task was well wfl but there were not many distractions around. Will address further as indicated. Voice: wfl but volume was decreased at times        Response & Tolerance to Activities:     Pain: No c/o pain pre or post treatment. Pain Scale 1: Numeric (0 - 10)  Pain Intensity 1: 0     After treatment:   [x]       Patient left in no apparent distress sitting up in chair  []       Patient left in no apparent distress in bed  [x]       Call bell left within reach  []       Nursing notified  []       Caregiver present  []       Bed alarm activated    COMMUNICATION/COLLABORATION:   Patient was educated regarding His deficit(s) of speech and swallowing as this relates to His diagnosis of CVA. He demonstrated Good understanding as evidenced by verbalized understanding.     The patients plan of care was discussed with: Registered Nurse    Abida Arenas, SLP  Time Calculation: 30 mins

## 2017-08-04 NOTE — PROGRESS NOTES
I spoke to pt's wife Christiano [address] cell is [phone]. She requested that a referral be sent to [facility]. I have sent a referral to [facility] in [employer]. CM will continue to follow.  Thanks BURTON Heredia

## 2017-08-04 NOTE — PROGRESS NOTES
Problem: Self Care Deficits Care Plan (Adult)  Goal: *Acute Goals and Plan of Care (Insert Text)  Occupational Therapy Goals  Initiated 8/4/2017  1. Patient will perform grooming with supervision/setup with best technique within 7 day(s). 2. Patient will perform upper body dressing and bathing with supervision/setup with best technique within 7 day(s). 3. Patient will perform lower body dressing and bathing with minimum assistance with best technique within 7 day(s). 4. Patient will perform toilet transfers with CGA with best technique within 7 day(s). 5 Patient will participate in upper extremity therapeutic exercise/activities with minimal assistance with focus on R, dominant UE strength, coordination, motor planning and sequencing within 7 day(s). 6. Patient will improve Fugl-Martinez Assessment of (R) Upper Extremity by 5 points within 7 day. OCCUPATIONAL THERAPY EVALUATION-NEURO POPULATION  Patient: Anahi Barroso (89 y.o. male)  Date: 8/4/2017  Primary Diagnosis: Acute CVA (cerebrovascular accident) [facility])        Precautions:  WBAT, Fall      ASSESSMENT :  Based on the objective data described below, the patient presents with decreased activity tolerance following admission on 8/3 for speech impairments, dysphagia, and confusion; Also noted to have R facial droop and R side weakness on admission. Neuro work-up revealed acute CVA; MRI specifically found Small areas of acute infarction in the L superior periventricular white matter extending into the left posterior lentiform nucleus. PTA, patient lives with his wife, is retired but stays relatively active, drives, and is independent with all care with no history of falls. Patient's cousin was present for evaluation and reports his wife had left the hospital only briefly and she would provide update based on today's tx/OT recs.   Today, patient presents with elevated BP, 216/109 on R UE and 198/105 L UE; RN aware, agreeable to manage, and patient educated on permissive HTN parameters/indications. Patient was primarily limited by R dominant side weakness, impaired R UE coordination, impaired motor planning + sequencing, and impaired balance. Additionally, patient with slow processing with all commands, requires increased time to process request and then additional time needed for motor planning + sequencing to carryout task. Patient required min to mod A for bed mobility and mod A for OOB transfers. Patient required min A for UB ADLs and min to mod A for LB ADLs. Extensive education provided to both patient and family regarding signs and symptoms of stroke, including BEFAST acronym and importance of calling 911, CVA risk factors, principles of neuro plasticity, and neuro re-education exercises to address specific deficits. Patient would benefit from continued skilled OT to progress towards goals and improve overall independence. Would highly recommend inpatient rehab at discharge. Patient will benefit from skilled intervention to address the above impairments.   Patients rehabilitation potential is considered to be Good  Factors which may influence rehabilitation potential include:   [X]             None noted  [ ]             Mental ability/status  [ ]             Medical condition  [ ]             Home/family situation and support systems  [ ]             Safety awareness  [ ]             Pain tolerance/management  [ ]             Other:        PLAN :  Recommendations and Planned Interventions:  [X]               Self Care Training                  [X]        Therapeutic Activities  [X]               Functional Mobility Training    [ ]        Cognitive Retraining  [X]               Therapeutic Exercises           [X]        Endurance Activities  [X]               Balance Training                   [X]        Neuromuscular Re-Education  [ ]               Visual/Perceptual Training     [X]   Home Safety Training  [X]               Patient Education [X]        Family Training/Education  [ ]               Other (comment):     Frequency/Duration: Patient will be followed by occupational therapy 5-6 times a week to address goals. Discharge Recommendations: Inpatient Rehab  Further Equipment Recommendations for Discharge: none at this time       SUBJECTIVE:   Patient stated I have a headache now.   RN made aware of patient's c/o of headache, rated 10/10      OBJECTIVE DATA SUMMARY:   HISTORY:   Past Medical History:   Diagnosis Date    Family history of skin cancer      Hypertension      Skin cancer       Squamous skin cancer on top of head    Stroke ([facility] [address] 2008     TIA    Sun-damaged skin      Sunburn, blistering      Unspecified sleep apnea       cpap     Past Surgical History:   Procedure Laterality Date    HX KNEE ARTHROSCOPY Bilateral 2011    HX ORTHOPAEDIC   2004     TRIGGER FINGER-R HAND    HX ORTHOPAEDIC         finger - left         Prior Level of Function/Home Situation: Patient lives with his wife. See assessment section for PLOF information as reported by pt. Home Situation  Home Environment: Private residence  # Steps to Enter: 3  Rails to Enter: Yes  One/Two Story Residence: One story  Living Alone: No  Support Systems: Spouse/Significant Other/Partner  Patient Expects to be Discharged to[de-identified] Private residence  Current DME Used/Available at Home: Walker, rolling  Tub or Shower Type: Tub/Shower combination  [X]  Right hand dominant             [ ]  Left hand dominant     EXAMINATION OF PERFORMANCE DEFICITS:  Cognitive/Behavioral Status:  Neurologic State: Alert  Orientation Level: Oriented X4  Cognition: Poor safety awareness; Impaired decision making; Follows commands (follows commands with increased time)  Perception: Cues to maintain midline in standing  Perseveration: No perseveration noted  Safety/Judgement: Awareness of environment     Skin: Intact in the uppers     Edema: None noted in the uppers Hearing: Auditory  Auditory Impairment: None     Vision/Perceptual:    Tracking: Able to track stimulus in all quadrants w/o difficulty    Saccades: Within Defined Limits    Convergence: Normal (within 6 inches from nose)    Visual Fields:  (WDL)  Diplopia: No    Acuity: Impaired far vision;Able to read employee name badge without difficulty; Able to read clock/calendar on wall without difficulty    Corrective Lenses: Glasses     Range of Motion:  WDL in the uppers     Strength:  Decreased but functional in the uppers     Coordination:  Fine Motor Skills-Upper: Right Impaired;Left Intact    Gross Motor Skills-Upper: Right Impaired;Left Intact     Tone & Sensation:  Tone: Normal  Sensation: Intact     Balance:  Sitting: Impaired  Sitting - Static: Good (unsupported)  Sitting - Dynamic: Fair (occasional)  Standing: Impaired  Standing - Static: Poor  Standing - Dynamic : Poor     Functional Mobility and Transfers for ADLs:  Bed Mobility:  Rolling: Moderate assistance;Assist x1;Additional time  Supine to Sit: Moderate assistance;Assist x1;Additional time  Sit to Supine: Minimum assistance;Assist x1;Additional time  Scooting: Moderate assistance;Assist x1;Additional time (max verbal + manual cues for weight shifting)     Transfers:  Sit to Stand: Moderate assistance;Assist x1  Stand to Sit: Moderate assistance;Assist x1  Bed to Chair: Moderate assistance;Assist x1  Toilet Transfer : Moderate assistance;Assist x1     ADL Assessment:  Feeding: Minimum assistance     Oral Facial Hygiene/Grooming: Minimum assistance     Bathing: Minimum assistance     Upper Body Dressing: Minimum assistance     Lower Body Dressing: Moderate assistance     Toileting: Minimum assistance     Cognitive Retraining  Safety/Judgement: Awareness of environment      Education + Instruction provided to patient and family with demonstration of good understanding and teach back of all information performed.         Patient was educated on the principles of neuro plasticity. Patient was educated how his symptoms on admission relate to his diagnosis of CVA. Patient was encouraged to use the R, dominant UE the same as prior to admission and not to avoid use just due to degree of difficulty. Patient educated that repetition and continued use will improve overall function and encourage return of any function that my have been lost or impaired. Patient educated on the signs and symptoms of stroke, including BEFAST acronym, and importance of calling 911. Teach back of information was performed with 100 % accuracy. Patient was encouraged to complete simple R UE strength exercises as able. Patient educated on the importance of not only using but also drawing as much attention as able to the R side. Patient also instructed to visualize the limb when completing exercises for improved accuracy by providing visual input. The following exercises were performed with good tolerance: shoulder flexion/extension, shoulder ab/adduction, elbow flexion/extension, wrist flexion/extension, finger flexion/extension, forearm supination/pronation. Patient was educated on coordination exercises. Patient was instructed to touch nose with R hand then reach out and touch knee; First visualizing, then to close eyes as a progression. Patient was encouraged to do this at a steady tempo and to speed up as able as an additional progression. Also, instructed to make target smaller for progression (i.e. Reaching for tissue box on the table). Patient instructed to make one progression at a time in order to report what progression he was more successful with for future adjustments in ex program.       Patient also instructed to place hands in lap. Istructed to supinate and pronate both hands at steady tempo; First start with L hand, then do R hand, then both together coordinating the movements at the same pace with the same accuracy.  Patient instructed to improve speed as a progression or to have eyes closed as a progression; With only one change made at a time. Patient also instructed to touch each finger to the thumb at a steady tempo and speed up pace as able. Patient instructed to write name 5x every hour for improved coordination with fine motor skills by using a functional task that provides meaning. Functional Measure:  Fugl-Martinez Assessment of Motor Recovery after Stroke:       Reflex Activity  Flexors/Biceps/Fingers: Can be elicited  Extensors/Triceps: Can be elicited  Reflex Subtotal: 4     Volitional Movement Within Synergies  Shoulder Retraction: Full  Shoulder Elevation: Full  Shoulder Abduction (90 degrees): Full  Shoulder External Rotation: Full  Elbow Flexion: Full  Forearm Supination: Full  Shoulder Adduction/Internal Rotation: Full  Elbow Extension: Full  Forearm Pronation: Full  Subtotal: 18     Volitional Movement Mixing Synergies  Hand to Lumbar Spine: Full  Shoulder Flexion (0-90 degrees): Full  Pronation-Supination: Full  Subtotal: 6     Volitional Movement With Little or No Synergy  Shoulder Abduction (0-90 degrees): Full  Shoulder Flexion ( degrees): Partial  Pronation/Supination: Full  Subtotal : 5     Normal Reflex Activity  Biceps, Triceps, Finger Flexors:  Full  Subtotal : 2     Upper Extremity Total   Upper Extremity Total: 35     Wrist  Stability at 15 Degree Dorsiflexion: Partial  Repeated Dorsiflexion/ Volar Flexion: Full  Stability at 15 Degree Dorsiflexion: Partial  Repeated Dorsiflexion/ Volar Flexion: Full  Circumduction: Partial  Wrist Total: 7     Hand  Mass Flexion: Full  Mass Extension: Full  Grasp A: Partial  Grasp B: Partial  Grasp C: Partial  Grasp D: Full  Grasp E: Full  Hand Total: 11     Coordination/Speed  Tremor: None  Dysmetria: Marked  Time: 2-5s  Coordination/Speed Total : 3     Total A-D  Total A-D (Motor Function): 56/66         Percentage of impairment CH  0% CI  1-19% CJ  20-39% CK  40-59% CL  60-79% CM  80-99% CN  100%   Fugl-Martinez score: 0-66 66 53-65 39-52 26-38 13-25 1-12    0      This is a reliable/valid measure of arm function after a neurological event. It has established value to characterize functional status and for measuring spontaneous and therapy-induced recovery; tests proximal and distal motor functions. Fugl-Martinez Assessment  UE scores recorded between five and 30 days post neurologic event can be used to predict UE recovery at six months post neurologic event. Severe = 0-21 points   Moderately Severe = 22-33 points   Moderate = 34-47 points   Mild = 48-66 points  AUGUSTO Fraga, ZACK Kinsey, & XI Villanueva (1992). Measurement of motor recovery after stroke: Outcome assessment and sample size requirements. Stroke, 23, pp. 5247-6066.   --------------------------------------------------------------------------------------------------------------------------------------------------------------------  MCID:  Stroke:   Joyce Garrett et al, 2001; n = 171; mean age 79 (5) years; assessed within 16 (12) days of stroke, Acute Stroke)  FMA Motor Scores from Admission to Discharge   · 10 point increase in FMA Upper Extremity = 1.5 change in discharge FIM  · 10 point increase in FMA Lower Extremity = 1.9 change in discharge FIM  MDC:   Stroke:   Merlyn Castro et al, 2008, n = 14, mean age = 59.9 (14.6) years, assessed on average 14 (6.5) months post stroke, Chronic Stroke)   · FMA = 5.2 points for the Upper Extremity portion of the assessment         G codes: In compliance with CMSs Claims Based Outcome Reporting, the following G-code set was chosen for this patient based on their primary functional limitation being treated: The outcome measure chosen to determine the severity of the functional limitation was the Fugl-Martinez Assessment of Upper Extremity with a score of 56/66 which was correlated with the impairment scale.       · Self Care:               - CURRENT STATUS:    CI - 1%-19% impaired, limited or restricted               - GOAL STATUS:           CH - 0% impaired, limited or restricted               - D/C STATUS:                       ---------------To be determined---------------      Occupational Therapy Evaluation Charge Determination   History Examination Decision-Making   LOW Complexity : Brief history review  LOW Complexity : 1-3 performance deficits relating to physical, cognitive , or psychosocial skils that result in activity limitations and / or participation restrictions  LOW Complexity : No comorbidities that affect functional and no verbal or physical assistance needed to complete eval tasks       Based on the above components, the patient evaluation is determined to be of the following complexity level: LOW   Pain:  Pain Scale 1: Numeric (0 - 10)  Pain Intensity 1: 0              Activity Tolerance:   Patient tolerated eval well. Please refer to the flowsheet for vital signs taken during this treatment. After treatment:   [ ] Patient left in no apparent distress sitting up in chair  [X] Patient left in no apparent distress in bed  [X] Call bell left within reach  [X] Nursing notified  [X] Caregiver present-cousin  [X] Bed alarm activated      COMMUNICATION/EDUCATION:   The patients plan of care was discussed with: Physical Therapist, Registered Nurse and patient. .  [X] Home safety education was provided and the patient/caregiver indicated understanding. [X] Patient/family have participated as able in goal setting and plan of care. [X] Patient/family agree to work toward stated goals and plan of care. [ ] Patient understands intent and goals of therapy, but is neutral about his/her participation. [ ] Patient is unable to participate in goal setting and plan of care. This patients plan of care is appropriate for delegation to [facility].      Thank you for this referral.  Jim Guevara, OTR/L  Time Calculation: 47 mins

## 2017-08-05 LAB
ANION GAP BLD CALC-SCNC: 6 MMOL/L (ref 5–15)
BUN SERPL-MCNC: 14 MG/DL (ref 6–20)
BUN/CREAT SERPL: 12 (ref 12–20)
CALCIUM SERPL-MCNC: 8.8 MG/DL (ref 8.5–10.1)
CHLORIDE SERPL-SCNC: 103 MMOL/L (ref 97–108)
CO2 SERPL-SCNC: 31 MMOL/L (ref 21–32)
CREAT SERPL-MCNC: 1.17 MG/DL (ref 0.7–1.3)
GLUCOSE BLD STRIP.AUTO-MCNC: 115 MG/DL (ref 65–100)
GLUCOSE BLD STRIP.AUTO-MCNC: 137 MG/DL (ref 65–100)
GLUCOSE BLD STRIP.AUTO-MCNC: 157 MG/DL (ref 65–100)
GLUCOSE BLD STRIP.AUTO-MCNC: 167 MG/DL (ref 65–100)
GLUCOSE SERPL-MCNC: 161 MG/DL (ref 65–100)
POTASSIUM SERPL-SCNC: 3.6 MMOL/L (ref 3.5–5.1)
SERVICE CMNT-IMP: ABNORMAL
SODIUM SERPL-SCNC: 140 MMOL/L (ref 136–145)

## 2017-08-05 PROCEDURE — 97530 THERAPEUTIC ACTIVITIES: CPT

## 2017-08-05 PROCEDURE — 80048 BASIC METABOLIC PNL TOTAL CA: CPT | Performed by: INTERNAL MEDICINE

## 2017-08-05 PROCEDURE — 65660000000 HC RM CCU STEPDOWN

## 2017-08-05 PROCEDURE — 97116 GAIT TRAINING THERAPY: CPT

## 2017-08-05 PROCEDURE — 74011250636 HC RX REV CODE- 250/636: Performed by: INTERNAL MEDICINE

## 2017-08-05 PROCEDURE — 36415 COLL VENOUS BLD VENIPUNCTURE: CPT | Performed by: INTERNAL MEDICINE

## 2017-08-05 PROCEDURE — 82962 GLUCOSE BLOOD TEST: CPT

## 2017-08-05 PROCEDURE — 74011250637 HC RX REV CODE- 250/637: Performed by: INTERNAL MEDICINE

## 2017-08-05 PROCEDURE — 74011250637 HC RX REV CODE- 250/637: Performed by: PSYCHIATRY & NEUROLOGY

## 2017-08-05 PROCEDURE — 74011636637 HC RX REV CODE- 636/637: Performed by: INTERNAL MEDICINE

## 2017-08-05 RX ORDER — LISINOPRIL 20 MG/1
20 TABLET ORAL DAILY
Status: DISCONTINUED | OUTPATIENT
Start: 2017-08-06 | End: 2017-08-09 | Stop reason: HOSPADM

## 2017-08-05 RX ADMIN — LISINOPRIL 10 MG: 5 TABLET ORAL at 09:14

## 2017-08-05 RX ADMIN — GLIPIZIDE 2.5 MG: 2.5 TABLET, FILM COATED, EXTENDED RELEASE ORAL at 09:13

## 2017-08-05 RX ADMIN — ENOXAPARIN SODIUM 40 MG: 40 INJECTION SUBCUTANEOUS at 17:46

## 2017-08-05 RX ADMIN — ATORVASTATIN CALCIUM 20 MG: 20 TABLET, FILM COATED ORAL at 20:26

## 2017-08-05 RX ADMIN — VERAPAMIL HYDROCHLORIDE 240 MG: 240 TABLET, FILM COATED, EXTENDED RELEASE ORAL at 09:13

## 2017-08-05 RX ADMIN — Medication 5 ML: at 14:00

## 2017-08-05 RX ADMIN — INSULIN LISPRO 2 UNITS: 100 INJECTION, SOLUTION INTRAVENOUS; SUBCUTANEOUS at 09:14

## 2017-08-05 RX ADMIN — CLOPIDOGREL 75 MG: 75 TABLET, FILM COATED ORAL at 09:14

## 2017-08-05 RX ADMIN — INSULIN LISPRO 2 UNITS: 100 INJECTION, SOLUTION INTRAVENOUS; SUBCUTANEOUS at 11:30

## 2017-08-05 RX ADMIN — SERTRALINE 100 MG: 50 TABLET, FILM COATED ORAL at 09:14

## 2017-08-05 RX ADMIN — HYDROCHLOROTHIAZIDE 12.5 MG: 25 TABLET ORAL at 09:14

## 2017-08-05 RX ADMIN — Medication 10 ML: at 05:52

## 2017-08-05 RX ADMIN — MELATONIN TAB 3 MG 3 MG: 3 TAB at 20:26

## 2017-08-05 NOTE — PROGRESS NOTES
7366 - Bedside and Verbal shift change report given to Jose Luis Smyth (oncoming nurse) by Deisi Lindsey (offgoing nurse). Report included the following information SBAR, Kardex, Intake/Output, Accordion, Recent Results and Cardiac Rhythm NSR BBB. Pt easily arousable during report and currently on the bedside chair. Respirations even and unlabored while on his CPAP. Reinforced the need to call for assistance.

## 2017-08-05 NOTE — PROGRESS NOTES
Dr Dawson Gillespie ,physiatrist is willing to accept pt in the morning if his blood pressures remain stable.   I will check on pt in the am and will discuss with nurse Laney Kumar @ [address] [facility]([phone])  Margarita Arroyo

## 2017-08-05 NOTE — PROGRESS NOTES
Hospitalist Progress Note    NAME: Patricia Kohler   :  1945   MRN:  [id number]       Assessment / Plan:     80 yo WM w/ hx of HTN, CUCO, depression admitted for acute CVA     1. Acute CVA:  Presented with acute confusion/right sided weakness. Not deemed T-PA candidate. Head CT-negative. MRI brain-small areas of acute infarction in the left superior periventricular whitematter extending into the left posterior lentiform nucleus. Brain MRA-no evidence for acute large vessel arterial occlusion or significant stenosis. CTA-mild to moderate stenosis in the internal carotid artery on the left. Carotid dopplers unremarkable. TTE w/o shunt, preserved EF. Managing with Plavix/Statin. PT/ OT/ST.     2. DM-2: New Dx. Random glucose 195 on admission. A1c 8.7%. Managing with diet/SSI/Glipizide.      3. Uncontrolled HTN: Allowed permissive HTN initially. Now on Lisinopril/HCTZ.      4. CUCO: On nocturnal CPAP.       5. Depression: Stable on Zoloft.       6. Morbid Obesity: Would benefit from weight loss. Comment: [facility]. Body mass index is 37.46 kg/(m^2). Code status: Full  Prophylaxis: SCD's  Recommended Disposition: SNF/LTC     Subjective:     Chief Complaint / Reason for Physician Visit  \"No new issues\". Discussed with RN events overnight. Review of Systems:  Symptom Y/N Comments  Symptom Y/N Comments   Fever/Chills N   Chest Pain N    Poor Appetite N   Edema N    Cough N   Abdominal Pain N    Sputum N   Joint Pain N    SOB/HARPER N   Pruritis/Rash N    Nausea/vomit N   Tolerating PT/OT Y    Diarrhea N   Tolerating Diet     Constipation N   Other       Could NOT obtain due to:      Objective:     VITALS:   Last 24hrs VS reviewed since prior progress note.  Most recent are:  Patient Vitals for the past 24 hrs:   Temp Pulse Resp BP SpO2   17 1430 - 77 - - -   17 1111 98.2 °F (36.8 °C) 82 18 (!) 149/94 94 %   17 0935 - - - 184/85 -   17 0746 97.2 °F (36.2 °C) 68 18 (!) 165/96 96 %   08/05/17 0702 - 69 - - -   08/05/17 0501 98.3 °F (36.8 °C) 69 16 162/90 96 %   08/05/17 0029 98 °F (36.7 °C) 70 16 144/80 95 %   08/04/17 2246 - 68 - - -   08/04/17 2014 98.4 °F (36.9 °C) 75 16 (!) 177/97 93 %   08/04/17 1625 98.6 °F (37 °C) 80 16 167/86 95 %       Intake/Output Summary (Last 24 hours) at 08/05/17 1550  Last data filed at 08/05/17 1244   Gross per 24 hour   Intake              560 ml   Output             1150 ml   Net             -590 ml        PHYSICAL EXAM:  General: WD, WN. Alert, cooperative, no acute distress    EENT:  EOMI. Anicteric sclerae. MMM  Resp:  CTA bilaterally, no wheezing or rales. No accessory muscle use  CV:  Regular  rhythm,  No edema  GI:  Soft, Non distended, Non tender.  +Bowel sounds  Neurologic:  Alert and oriented X 3, normal speech, stable right hemiparesis. Psych:   Good insight. Not anxious nor agitated  Skin:  No rashes. No jaundice    Reviewed most current lab test results and cultures  YES  Reviewed most current radiology test results   YES  Review and summation of old records today    NO  Reviewed patient's current orders and MAR    YES  PMH/SH reviewed - no change compared to H&P  ________________________________________________________________________  Care Plan discussed with:    Comments   Patient [place]     Consultant                        Multidiciplinary team rounds were held today with , nursing, pharmacist and clinical coordinator. Patient's plan of care was discussed; medications were reviewed and discharge planning was addressed.      ________________________________________________________________________  Total NON critical care TIME:  <30   Minutes    Total CRITICAL CARE TIME Spent:   Minutes non procedure based      Comments   >50% of visit spent in counseling and coordination of care     ________________________________________________________________________  Arlander Fabry, MD     Procedures: see electronic medical records for all procedures/Xrays and details which were not copied into this note but were reviewed prior to creation of Plan. LABS:  I reviewed today's most current labs and imaging studies.   Pertinent labs include:  Recent Labs      08/04/17 0053 08/03/17   0950   WBC  6.3  6.0   HGB  14.8  14.4   HCT  44.2  43.1   PLT  155  152     Recent Labs      08/05/17   0555  08/04/17 0053 08/03/17   0950  08/03/17   0942   NA  140  141  142   --    K  3.6  3.6  4.0   --    CL  103  104  105   --    CO2  31  25  31   --    GLU  161*  163*  195*   --    BUN  14  11  14   --    CREA  1.17  0.99  1.12   --    CA  8.8  8.8  9.0   --    MG   --   2.4   --    --    ALB   --   3.9   --    --    TBILI   --   0.6   --    --    SGOT   --   34   --    --    ALT   --   74   --    --    INR   --    --    --   1.0       Signed: Thais Garcia MD

## 2017-08-05 NOTE — PROGRESS NOTES
0630 Patient had to rebalance himself about three times to maintain a steady gait, assisted to the bathroom without any incident. Patient is back in the chair with chair alarm. 0730 Bedside and Verbal shift change report given to Jose Luis Smyth (oncoming nurse) by Diomedes Maria (offgoing nurse). Report included the following information SBAR, Kardex and MAR.

## 2017-08-05 NOTE — PROGRESS NOTES
Problem: Mobility Impaired (Adult and Pediatric)  Goal: *Acute Goals and Plan of Care (Insert Text)  Physical Therapy Goals  Initiated 8/4/2017  1. Patient will move from supine to sit and sit to supine in bed with minimal assistance/contact guard assist within 7 day(s). 2. Patient will transfer from bed to chair and chair to bed with minimal assistance/contact guard assist using the least restrictive device within 7 day(s). 3. Patient will perform sit to stand with minimal assistance/contact guard assist within 7 day(s). 4. Patient will ambulate with minimal assistance/contact guard assist for 50 feet with the least restrictive device within 7 day(s). 5. Patient will ascend/descend 4 stairs with 1 handrail(s) with minimal assistance/contact guard assist within 7 day(s). 6. Patient will improve Stinson Balance score by 7 points within 7 days. PHYSICAL THERAPY TREATMENT  Patient: Rosangela Ellis (22 y.o. male)  Date: 8/5/2017  Diagnosis: Acute CVA (cerebrovascular accident) [facility]) <principal problem not specified>       Precautions: WBAT, Fall  Chart, physical therapy assessment, plan of care and goals were reviewed. ASSESSMENT:  Pt presents up recliner and agreeable to treatment with pt reporting some increase in strength in right LE. /85     Prior to gt training pt completed marching in standing with increase lean right, mild LOB with Min/Mod A to correct. Gt training of 40 feet with hand held assist Min/Mod A with pt able to correct lean right with constant verbal and tactile cues. Pt instructed to complete turns to stronger side to avoid LOB, pt with apparent fatigue post treatment. Chair alarm pad set up and activated.   Progression toward goals:  [ ]      Improving appropriately and progressing toward goals  [X]      Improving slowly and progressing toward goals  [ ]      Not making progress toward goals and plan of care will be adjusted       PLAN:  Patient continues to benefit from skilled intervention to address the above impairments. Continue treatment per established plan of care. Discharge Recommendations:  Inpatient Rehab  Further Equipment Recommendations for Discharge:  TBD       SUBJECTIVE:   Patient stated Yes.       OBJECTIVE DATA SUMMARY:   Critical Behavior:  Neurologic State: Alert  Orientation Level: Oriented X4  Cognition: Follows commands  Safety/Judgement: Awareness of environment  Functional Mobility Training:  Bed Mobility:            received in sitting                    Transfers:  Sit to Stand: Minimum assistance; Moderate assistance  Stand to Sit: Minimum assistance                             Balance:  Sitting: Intact; With support  Standing: Impaired; With support  Standing - Static: Fair  Standing - Dynamic : Fair  Ambulation/Gait Training:  Distance (ft): 40 Feet (ft)  Assistive Device: Gait belt (Hand held assist)  Ambulation - Level of Assistance: Moderate assistance                                            Therapeutic Exercises: Ankle dorsiflexion x 30 reps;  Standing - marching in place x 10 reps  Pain:  Pain Scale 1: Numeric (0 - 10)  Pain Intensity 1: 0              Activity Tolerance:   Fair.   After treatment:   [X] Patient left in no apparent distress sitting up in chair  [ ] Patient left in no apparent distress in bed  [X] Call bell left within reach  [X] Nursing notified  [ ] Caregiver present  [X] chair alarm activated      COMMUNICATION/COLLABORATION:   The patients plan of care was discussed with: Registered Nurse     Zahra Plunkett PTA   Time Calculation: 23 mins

## 2017-08-06 LAB
ANION GAP BLD CALC-SCNC: 6 MMOL/L (ref 5–15)
BASOPHILS # BLD AUTO: 0 K/UL (ref 0–0.1)
BASOPHILS # BLD: 1 % (ref 0–1)
BUN SERPL-MCNC: 19 MG/DL (ref 6–20)
BUN/CREAT SERPL: 17 (ref 12–20)
CALCIUM SERPL-MCNC: 8.7 MG/DL (ref 8.5–10.1)
CHLORIDE SERPL-SCNC: 106 MMOL/L (ref 97–108)
CO2 SERPL-SCNC: 29 MMOL/L (ref 21–32)
CREAT SERPL-MCNC: 1.14 MG/DL (ref 0.7–1.3)
EOSINOPHIL # BLD: 0.2 K/UL (ref 0–0.4)
EOSINOPHIL NFR BLD: 2 % (ref 0–7)
ERYTHROCYTE [DISTWIDTH] IN BLOOD BY AUTOMATED COUNT: 13.6 % (ref 11.5–14.5)
GLUCOSE BLD STRIP.AUTO-MCNC: 105 MG/DL (ref 65–100)
GLUCOSE BLD STRIP.AUTO-MCNC: 115 MG/DL (ref 65–100)
GLUCOSE BLD STRIP.AUTO-MCNC: 118 MG/DL (ref 65–100)
GLUCOSE BLD STRIP.AUTO-MCNC: 143 MG/DL (ref 65–100)
GLUCOSE SERPL-MCNC: 145 MG/DL (ref 65–100)
HCT VFR BLD AUTO: 45.5 % (ref 36.6–50.3)
HGB BLD-MCNC: 15.2 G/DL (ref 12.1–17)
LYMPHOCYTES # BLD AUTO: 27 % (ref 12–49)
LYMPHOCYTES # BLD: 1.8 K/UL (ref 0.8–3.5)
MCH RBC QN AUTO: 30.5 PG (ref 26–34)
MCHC RBC AUTO-ENTMCNC: 33.4 G/DL (ref 30–36.5)
MCV RBC AUTO: 91.2 FL (ref 80–99)
MONOCYTES # BLD: 0.6 K/UL (ref 0–1)
MONOCYTES NFR BLD AUTO: 9 % (ref 5–13)
NEUTS SEG # BLD: 4.2 K/UL (ref 1.8–8)
NEUTS SEG NFR BLD AUTO: 61 % (ref 32–75)
PLATELET # BLD AUTO: 166 K/UL (ref 150–400)
POTASSIUM SERPL-SCNC: 3.3 MMOL/L (ref 3.5–5.1)
RBC # BLD AUTO: 4.99 M/UL (ref 4.1–5.7)
SERVICE CMNT-IMP: ABNORMAL
SODIUM SERPL-SCNC: 141 MMOL/L (ref 136–145)
WBC # BLD AUTO: 6.9 K/UL (ref 4.1–11.1)

## 2017-08-06 PROCEDURE — 74011250637 HC RX REV CODE- 250/637: Performed by: PSYCHIATRY & NEUROLOGY

## 2017-08-06 PROCEDURE — 74011250636 HC RX REV CODE- 250/636: Performed by: INTERNAL MEDICINE

## 2017-08-06 PROCEDURE — 74011250637 HC RX REV CODE- 250/637: Performed by: INTERNAL MEDICINE

## 2017-08-06 PROCEDURE — 65660000000 HC RM CCU STEPDOWN

## 2017-08-06 PROCEDURE — 85025 COMPLETE CBC W/AUTO DIFF WBC: CPT | Performed by: INTERNAL MEDICINE

## 2017-08-06 PROCEDURE — 82962 GLUCOSE BLOOD TEST: CPT

## 2017-08-06 PROCEDURE — 80048 BASIC METABOLIC PNL TOTAL CA: CPT | Performed by: INTERNAL MEDICINE

## 2017-08-06 PROCEDURE — 36415 COLL VENOUS BLD VENIPUNCTURE: CPT | Performed by: INTERNAL MEDICINE

## 2017-08-06 PROCEDURE — 97116 GAIT TRAINING THERAPY: CPT | Performed by: PHYSICAL THERAPIST

## 2017-08-06 PROCEDURE — 74011636637 HC RX REV CODE- 636/637: Performed by: INTERNAL MEDICINE

## 2017-08-06 RX ORDER — POTASSIUM CHLORIDE 1.5 G/1.77G
40 POWDER, FOR SOLUTION ORAL
Status: COMPLETED | OUTPATIENT
Start: 2017-08-06 | End: 2017-08-06

## 2017-08-06 RX ADMIN — LISINOPRIL 20 MG: 20 TABLET ORAL at 08:26

## 2017-08-06 RX ADMIN — ATORVASTATIN CALCIUM 20 MG: 20 TABLET, FILM COATED ORAL at 22:04

## 2017-08-06 RX ADMIN — Medication 10 ML: at 06:38

## 2017-08-06 RX ADMIN — HYDROCHLOROTHIAZIDE 12.5 MG: 25 TABLET ORAL at 08:25

## 2017-08-06 RX ADMIN — GLIPIZIDE 2.5 MG: 2.5 TABLET, FILM COATED, EXTENDED RELEASE ORAL at 08:26

## 2017-08-06 RX ADMIN — CLOPIDOGREL 75 MG: 75 TABLET, FILM COATED ORAL at 08:25

## 2017-08-06 RX ADMIN — INSULIN LISPRO 2 UNITS: 100 INJECTION, SOLUTION INTRAVENOUS; SUBCUTANEOUS at 08:25

## 2017-08-06 RX ADMIN — SERTRALINE 100 MG: 50 TABLET, FILM COATED ORAL at 08:25

## 2017-08-06 RX ADMIN — VERAPAMIL HYDROCHLORIDE 240 MG: 240 TABLET, FILM COATED, EXTENDED RELEASE ORAL at 08:26

## 2017-08-06 RX ADMIN — Medication 10 ML: at 13:40

## 2017-08-06 RX ADMIN — POTASSIUM CHLORIDE 40 MEQ: 1.5 POWDER, FOR SOLUTION ORAL at 14:52

## 2017-08-06 RX ADMIN — MELATONIN TAB 3 MG 3 MG: 3 TAB at 22:04

## 2017-08-06 RX ADMIN — ENOXAPARIN SODIUM 40 MG: 40 INJECTION SUBCUTANEOUS at 17:30

## 2017-08-06 NOTE — PROGRESS NOTES
Bedside and Verbal shift change report given to Shirin, [address] [facility] (oncoming nurse) by Annalee Parkinson RN (offgoing nurse). Report included the following information SBAR, Kardex, Intake/Output, MAR, Accordion and Recent Results.

## 2017-08-06 NOTE — PROGRESS NOTES
Patient arrived to the unit from [facility]. Primary Nurse Doug Kumar.  Fatou Nieves RN and KALYN Grey performed a dual skin assessment on this patient No impairment noted  Luke score is 20

## 2017-08-06 NOTE — PROGRESS NOTES
Shift Summary  8/6/2017  [id number]    Pt awake at time of bedside shift report received from [facility]. AM vitals, assessment, and meds complete without difficulty, AM rhythm strip shows NSR. Reviewed today's plan of care and goals with patient/family; plan of care today includes safety, monitor VS.  Pt has been up w/assistance throughout the morning. TRANSFER - OUT REPORT:    Verbal report given to Will (name) on Thierry Rosales  being transferred to [address] [place] [address] (unit) for routine progression of care   At [id number]. Report consisted of patients Situation, Background, Assessment and   Recommendations(SBAR). Information from the following report(s) SBAR, Kardex, Intake/Output, MAR, Accordion, Recent Results and Cardiac Rhythm NSR. was reviewed with the receiving nurse. Lines:   Peripheral IV 08/03/17 Left Antecubital (Active)   Site Assessment Clean, dry, & intact 8/6/2017  8:23 AM   Phlebitis Assessment 0 8/6/2017  8:23 AM   Infiltration Assessment 0 8/6/2017  8:23 AM   Dressing Status Clean, dry, & intact 8/6/2017  8:23 AM   Dressing Type Transparent 8/6/2017  8:23 AM   Hub Color/Line Status Capped 8/6/2017  8:23 AM   Action Taken Open ports on tubing capped 8/5/2017  4:09 AM   Alcohol Cap Used Yes 8/6/2017  8:23 AM       Peripheral IV 08/03/17 Right Antecubital (Active)   Site Assessment Clean, dry, & intact 8/6/2017  8:23 AM   Phlebitis Assessment 0 8/6/2017  8:23 AM   Infiltration Assessment 0 8/6/2017  8:23 AM   Dressing Status Clean, dry, & intact 8/6/2017  8:23 AM   Dressing Type Transparent 8/6/2017  8:23 AM   Hub Color/Line Status Capped 8/6/2017  8:23 AM   Action Taken Open ports on tubing capped 8/5/2017  4:09 AM   Alcohol Cap Used Yes 8/6/2017  8:23 AM        Opportunity for questions and clarification was provided.       Patient transported at [phone] with:   Monitor  Registered Nurse  Tech

## 2017-08-06 NOTE — PROGRESS NOTES
Hospitalist Progress Note    NAME: Thierry Rosales   :  1945   MRN:  [id number]       Assessment / Plan:     82 yo WM w/ hx of HTN, CUCO, depression admitted for acute CVA. Problems:      1. Acute CVA:  Presented with acute confusion/right sided weakness. Not deemed T-PA candidate. Head CT-negative. MRI brain-small areas of acute infarction in the left superior periventricular whitematter extending into the left posterior lentiform nucleus. Brain MRA-no evidence for acute large vessel arterial occlusion or significant stenosis. CTA-mild to moderate stenosis in the internal carotid artery on the left. Carotid dopplers unremarkable. TTE w/o shunt, preserved EF. Managing with Plavix/Statin. PT/ OT/ST.     2. DM-2: New Dx. Random glucose 195 on admission. A1c 8.7%. Managing with diet/SSI/Glipizide.      3. Uncontrolled HTN: Allowed permissive HTN initially. Now on Verapamil/Lisinopril/HCTZ. Adjusting.     4. CUCO: On nocturnal CPAP.      5. Depression: Stable on Zoloft. 6. Hypokalemia: Repleting as indicated. Following lytes.       6. Morbid Obesity: Would benefit from weight loss. Comment: [facility]. Possibly on 17. Body mass index is 37.46 kg/(m^2). Code status: Full  Prophylaxis: SCD's  Recommended Disposition: SNF/LTC     Subjective:     Chief Complaint / Reason for Physician Visit  \"No new developments\". Discussed with RN events overnight. Review of Systems:  Symptom Y/N Comments  Symptom Y/N Comments   Fever/Chills N   Chest Pain N    Poor Appetite N   Edema N    Cough N   Abdominal Pain N    Sputum N   Joint Pain N    SOB/HARPER N   Pruritis/Rash N    Nausea/vomit N   Tolerating PT/OT Y    Diarrhea N   Tolerating Diet     Constipation N   Other       Could NOT obtain due to:      Objective:     VITALS:   Last 24hrs VS reviewed since prior progress note.  Most recent are:  Patient Vitals for the past 24 hrs:   Temp Pulse Resp BP SpO2   17 1150 98.9 °F (37.2 °C) 78 24 167/90 93 %   08/06/17 0757 98.8 °F (37.1 °C) 83 20 165/90 95 %   08/06/17 0702 - 87 - - -   08/06/17 0307 97.4 °F (36.3 °C) 63 20 135/77 96 %   08/06/17 0001 - (!) 58 - - -   08/05/17 2347 98.3 °F (36.8 °C) 62 20 132/71 97 %   08/05/17 2300 - - - - 95 %   08/05/17 2021 98.5 °F (36.9 °C) 74 18 161/81 95 %   08/05/17 1629 - - - (!) 160/91 -   08/05/17 1626 98.4 °F (36.9 °C) 75 18 (!) 186/94 93 %   08/05/17 1430 - 77 - - -     No intake or output data in the 24 hours ending 08/06/17 1403     PHYSICAL EXAM:  General: WD, WN. Alert, cooperative, no acute distress    EENT:  EOMI. Anicteric sclerae. MMM  Resp:  CTA bilaterally, no wheezing or rales. No accessory muscle use  CV:  Regular  rhythm,  No edema  GI:  Soft, Non distended, Non tender.  +Bowel sounds  Neurologic:  Alert and oriented X 3, normal speech, stable right hemiparesis. Psych:   Good insight. Not anxious nor agitated  Skin:  No rashes. No jaundice    Reviewed most current lab test results and cultures  YES  Reviewed most current radiology test results   YES  Review and summation of old records today    NO  Reviewed patient's current orders and MAR    YES  PMH/SH reviewed - no change compared to H&P  ________________________________________________________________________  Care Plan discussed with:    Comments   [facility]     [facility]ant                        Multidiciplinary team rounds were held today with , nursing, pharmacist and clinical coordinator. Patient's plan of care was discussed; medications were reviewed and discharge planning was addressed.      ________________________________________________________________________  Total NON critical care TIME:  <30   Minutes    Total CRITICAL CARE TIME Spent:   Minutes non procedure based      Comments   >50% of visit spent in counseling and coordination of care     ________________________________________________________________________  Ceferino Kelsy MD Sophia     Procedures: see electronic medical records for all procedures/Xrays and details which were not copied into this note but were reviewed prior to creation of Plan. LABS:  I reviewed today's most current labs and imaging studies.   Pertinent labs include:  Recent Labs      08/06/17 0424 08/04/17 0053   WBC  6.9  6.3   HGB  15.2  14.8   HCT  45.5  44.2   PLT  166  155     Recent Labs      08/06/17 0424 08/05/17 0555  08/04/17 0053   NA  141  140  141   K  3.3*  3.6  3.6   CL  106  103  104   CO2  29  31  25   GLU  145*  161*  163*   BUN  19  14  11   CREA  1.14  1.17  0.99   CA  8.7  8.8  8.8   MG   --    --   2.4   ALB   --    --   3.9   TBILI   --    --   0.6   SGOT   --    --   34   ALT   --    --   74       Signed: Aleja Roberts MD

## 2017-08-06 NOTE — PROGRESS NOTES
Problem: Mobility Impaired (Adult and Pediatric)  Goal: *Acute Goals and Plan of Care (Insert Text)  Physical Therapy Goals  Initiated 8/4/2017  1. Patient will move from supine to sit and sit to supine in bed with minimal assistance/contact guard assist within 7 day(s). 2. Patient will transfer from bed to chair and chair to bed with minimal assistance/contact guard assist using the least restrictive device within 7 day(s). 3. Patient will perform sit to stand with minimal assistance/contact guard assist within 7 day(s). 4. Patient will ambulate with minimal assistance/contact guard assist for 50 feet with the least restrictive device within 7 day(s). 5. Patient will ascend/descend 4 stairs with 1 handrail(s) with minimal assistance/contact guard assist within 7 day(s). 6. Patient will improve Stinson Balance score by 7 points within 7 days. PHYSICAL THERAPY TREATMENT  Patient: Catherine Trammell (82 y.o. male)  Date: 8/6/2017  Diagnosis: Acute CVA (cerebrovascular accident) [facility]) <principal problem not specified>       Precautions: WBAT, Fall      ASSESSMENT:  Patient received in no acute distress. He agreed to proceed with mobility following communion offered by . He denies pain at this time. Able to transition from supine to sit EOB using bed control positioning to assist him @ mod I level. Static sitting with close supervision for safety. Sit to stand with HHA min A x1, and ambulated ~ 80 feet with HHA /Mod A without assistive device. One brief standing rest interval using handrail for support. Returned to bed without incidence. Required min A to return to supine with vc's/tactile assist for alignment.    Progression toward goals:  [X]    Improving appropriately and progressing toward goals  [ ]    Improving slowly and progressing toward goals  [ ]    Not making progress toward goals and plan of care will be adjusted       PLAN:  Patient continues to benefit from skilled intervention to address the above impairments. Continue treatment per established plan of care. Discharge Recommendations:  Inpatient Rehab  Further Equipment Recommendations for Discharge:  TBD       SUBJECTIVE:   Patient stated Yes, I would like to go for a walk. I'm doing better today I think. Menjivar Mealing      OBJECTIVE DATA SUMMARY:   Critical Behavior:  Neurologic State: Alert  Orientation Level: Oriented X4  Cognition: Appropriate decision making, Appropriate safety awareness, Follows commands  Safety/Judgement: Awareness of environment  Functional Mobility Training:  Bed Mobility:  Supine to Sit: Stand-by asssistance;Assist x1;Additional time  Sit to Supine: Stand-by asssistance;Assist x1  Scooting: Assist x1;Additional time;Stand-by asssistance  Transfers:  Sit to Stand: Moderate assistance;Assist x1  Stand to Sit: Minimum assistance  Balance:  Sitting: Intact; With support  Sitting - Static: Good (unsupported)  Sitting - Dynamic: Fair (occasional)  Standing: Impaired; With support  Standing - Static: Fair  Standing - Dynamic : Fair  Ambulation/Gait Training:  Distance (ft): 80 Feet (ft)  Assistive Device: Gait belt  Ambulation - Level of Assistance: Moderate assistance  Gait Abnormalities: Decreased step clearance; Step to gait; Path deviations      Pain:  Pain Scale 1: Numeric (0 - 10)  Pain Intensity 1: 0   Activity Tolerance: Tolerated dynamic activity well. Frequent loss of balance to the right requiring assistance for correction. Please refer to the flowsheet for vital signs taken during this treatment.   After treatment:   [ ]    Patient left in no apparent distress sitting up in chair  [X]    Patient left in no apparent distress in bed  [X]    Call bell left within reach  [X]    Nursing notified  [ ]    Caregiver present  [X]    Bed alarm activated      COMMUNICATION/COLLABORATION:   The patients plan of care was discussed with: Registered Nurse     Nancy Lopez, PT   Time Calculation: 20 mins

## 2017-08-06 NOTE — PROGRESS NOTES
Bedside and Verbal shift change report given to [address] (oncoming nurse) by Kal Barker (offgoing nurse). Report included the following information SBAR, Kardex and MAR.

## 2017-08-06 NOTE — PROGRESS NOTES
I received notification from Echobit with [employer] that pt.'s aetna may be primary. I met with pt and his wife. Nadia Rodrigez is his primary and medicare is his secondary. I notified Arnel George nurse with [facility]. Because Nadia Rodrigez is his primary,preauthorization must be obtained in the morning. I notified pt.'s wife and pt that once insurance preauthorization has been obtained,then pt will be able to go to [facility] (hopefully on Monday). Mel Godoy will be working in the NICU here @ [facility] tomorrow. To reach her tomorrow if needed ,please call [phone]. Wife has pt.'s bag packed & marked & in her car for pt to have @ [facility]. I informed pt.'s nurse.   Wilene Kayser

## 2017-08-07 LAB
ANION GAP BLD CALC-SCNC: 11 MMOL/L (ref 5–15)
BASOPHILS # BLD AUTO: 0 K/UL (ref 0–0.1)
BASOPHILS # BLD: 1 % (ref 0–1)
BUN SERPL-MCNC: 20 MG/DL (ref 6–20)
BUN/CREAT SERPL: 19 (ref 12–20)
CALCIUM SERPL-MCNC: 9.1 MG/DL (ref 8.5–10.1)
CHLORIDE SERPL-SCNC: 103 MMOL/L (ref 97–108)
CO2 SERPL-SCNC: 26 MMOL/L (ref 21–32)
CREAT SERPL-MCNC: 1.07 MG/DL (ref 0.7–1.3)
EOSINOPHIL # BLD: 0.2 K/UL (ref 0–0.4)
EOSINOPHIL NFR BLD: 2 % (ref 0–7)
ERYTHROCYTE [DISTWIDTH] IN BLOOD BY AUTOMATED COUNT: 13.6 % (ref 11.5–14.5)
GLUCOSE BLD STRIP.AUTO-MCNC: 114 MG/DL (ref 65–100)
GLUCOSE BLD STRIP.AUTO-MCNC: 116 MG/DL (ref 65–100)
GLUCOSE BLD STRIP.AUTO-MCNC: 118 MG/DL (ref 65–100)
GLUCOSE BLD STRIP.AUTO-MCNC: 136 MG/DL (ref 65–100)
GLUCOSE SERPL-MCNC: 146 MG/DL (ref 65–100)
HCT VFR BLD AUTO: 45.2 % (ref 36.6–50.3)
HGB BLD-MCNC: 15.5 G/DL (ref 12.1–17)
LYMPHOCYTES # BLD AUTO: 32 % (ref 12–49)
LYMPHOCYTES # BLD: 2.4 K/UL (ref 0.8–3.5)
MCH RBC QN AUTO: 30.8 PG (ref 26–34)
MCHC RBC AUTO-ENTMCNC: 34.3 G/DL (ref 30–36.5)
MCV RBC AUTO: 89.7 FL (ref 80–99)
MONOCYTES # BLD: 0.7 K/UL (ref 0–1)
MONOCYTES NFR BLD AUTO: 9 % (ref 5–13)
NEUTS SEG # BLD: 4.2 K/UL (ref 1.8–8)
NEUTS SEG NFR BLD AUTO: 56 % (ref 32–75)
PLATELET # BLD AUTO: 163 K/UL (ref 150–400)
POTASSIUM SERPL-SCNC: 3.5 MMOL/L (ref 3.5–5.1)
RBC # BLD AUTO: 5.04 M/UL (ref 4.1–5.7)
SERVICE CMNT-IMP: ABNORMAL
SODIUM SERPL-SCNC: 140 MMOL/L (ref 136–145)
WBC # BLD AUTO: 7.4 K/UL (ref 4.1–11.1)

## 2017-08-07 PROCEDURE — 97112 NEUROMUSCULAR REEDUCATION: CPT

## 2017-08-07 PROCEDURE — 82962 GLUCOSE BLOOD TEST: CPT

## 2017-08-07 PROCEDURE — 36415 COLL VENOUS BLD VENIPUNCTURE: CPT | Performed by: INTERNAL MEDICINE

## 2017-08-07 PROCEDURE — 74011250637 HC RX REV CODE- 250/637: Performed by: INTERNAL MEDICINE

## 2017-08-07 PROCEDURE — 92507 TX SP LANG VOICE COMM INDIV: CPT

## 2017-08-07 PROCEDURE — 85025 COMPLETE CBC W/AUTO DIFF WBC: CPT | Performed by: INTERNAL MEDICINE

## 2017-08-07 PROCEDURE — 80048 BASIC METABOLIC PNL TOTAL CA: CPT | Performed by: INTERNAL MEDICINE

## 2017-08-07 PROCEDURE — 74011250637 HC RX REV CODE- 250/637: Performed by: PSYCHIATRY & NEUROLOGY

## 2017-08-07 PROCEDURE — 97116 GAIT TRAINING THERAPY: CPT

## 2017-08-07 PROCEDURE — 97530 THERAPEUTIC ACTIVITIES: CPT

## 2017-08-07 PROCEDURE — 74011250636 HC RX REV CODE- 250/636: Performed by: INTERNAL MEDICINE

## 2017-08-07 PROCEDURE — 65660000000 HC RM CCU STEPDOWN

## 2017-08-07 RX ADMIN — HYDROCHLOROTHIAZIDE 12.5 MG: 25 TABLET ORAL at 09:04

## 2017-08-07 RX ADMIN — CLOPIDOGREL 75 MG: 75 TABLET, FILM COATED ORAL at 09:03

## 2017-08-07 RX ADMIN — MELATONIN TAB 3 MG 3 MG: 3 TAB at 22:49

## 2017-08-07 RX ADMIN — LISINOPRIL 20 MG: 20 TABLET ORAL at 09:03

## 2017-08-07 RX ADMIN — ENOXAPARIN SODIUM 40 MG: 40 INJECTION SUBCUTANEOUS at 17:56

## 2017-08-07 RX ADMIN — ATORVASTATIN CALCIUM 20 MG: 20 TABLET, FILM COATED ORAL at 22:45

## 2017-08-07 RX ADMIN — VERAPAMIL HYDROCHLORIDE 240 MG: 240 TABLET, FILM COATED, EXTENDED RELEASE ORAL at 09:03

## 2017-08-07 RX ADMIN — Medication 10 ML: at 14:15

## 2017-08-07 RX ADMIN — Medication 10 ML: at 22:46

## 2017-08-07 RX ADMIN — SERTRALINE 100 MG: 50 TABLET, FILM COATED ORAL at 09:03

## 2017-08-07 RX ADMIN — GLIPIZIDE 2.5 MG: 2.5 TABLET, FILM COATED, EXTENDED RELEASE ORAL at 07:30

## 2017-08-07 NOTE — PROGRESS NOTES
Bedside and Verbal shift change report given to Nabila Norton (oncoming nurse) by Karley Herrera RN (offgoing nurse). Report included the following information SBAR, Kardex, Intake/Output, MAR, Accordion and Recent Results.

## 2017-08-07 NOTE — PROGRESS NOTES
Problem: Dysphagia (Adult)  Goal: *Acute Goals and Plan of Care (Insert Text)  SPEECH LANGUAGE PATHOLOGY TREATMENT  Patient: Romana Arts (73 y.o. male)  Date: 8/7/2017  Diagnosis: Acute CVA (cerebrovascular accident) ([facility] [address] <principal problem not specified>           ASSESSMENT:  Patient with mild dysarthria and expressive aphasia. He would benefit from speech therapy on rehab unit. Progression toward goals:  [X]       Improving appropriately and progressing toward goals  [ ]       Improving slowly and progressing toward goals  [ ]       Not making progress toward goals and plan of care will be adjusted       PLAN:  Patient continues to benefit from skilled intervention to address the above impairments. Continue treatment per established plan of care. Discharge Recommendations:  Inpatient Rehab       SUBJECTIVE:   Patient stated I don't have trouble talking. OBJECTIVE:   Mental Status:  Neurologic State: Alert  Orientation Level: Oriented X4  Cognition: Appropriate decision making  Perception: Cues to maintain midline in standing  Perseveration: No perseveration noted  Safety/Judgement: Awareness of environment  Treatment & Interventions: Motor Speech:      patient still with mildly hesitant speech, consonant distortion, but not enough to affect speech intelligibility. Language Comprehension and Expression:     Verbal Expression      noted occasional semantic paraphasia at conversational level. Noted word-retrieval delays. Noted word-finding incomplete occasionally, especially for proper names  Noted mild thought organization defictis at conversational level.                                                             Neuro-Linguistics:                                                  Voice:        Response & Tolerance to Activities:     Pain:  Pain Scale 1: Numeric (0 - 10)  Pain Intensity 1: 0     After treatment:   [ ]       Patient left in no apparent distress sitting up in chair  [ ]       Patient left in no apparent distress in bed  [ ]       Call bell left within reach  [ ]       Nursing notified  [ ]       Caregiver present  [ ]       Bed alarm activated      COMMUNICATION/COLLABORATION:   Patient was educated regarding His deficit(s) of dysarthria, aphasia  as this relates to His diagnosis of cva. He demonstrated Fair understanding as evidenced by decreased awreness of deficits. He is more concerned about his arm and leg.  .     The patients plan of care was discussed with: MANPREET Mock  Time Calculation: 15 mins

## 2017-08-07 NOTE — PROGRESS NOTES
8/9/2017 2:14 PM Pt going to room 406A nursing staff can call report to [phone]. JASWINDER Eldridge     8/9/2017 2:05 PM Message received that this pt is authed for [facility]. Plan is for this pt to go to [facility] today. Discussed with the pt. Discussed with pt's wife. [address] Lashonda Pike. He states that he is placing this pt's discharge orders. JASWINDER Eldridge     8/9/2017 10:47 AM CANDELARIA spoke with Kat Layton clinical liaison from [facility]. Pt's [employer] is still being processed by his insurance agency, [facility]. JASWINDER Eldridge     8/9/2017 10:09 AM Contacted Kat Layton with [employer]. Will follow up briefly. Chhaya Harrington [address]     8/7/2017 4:09 PM [employer] still working towards [employer]. Delay in auth experienced due to insurance. GALINA has sent clinicals and spoken with Harrison Corral at [facility]. CANDELARIA has called Keanu Augustine reviewer and requested utilizations reviews involvement. Chhaya Harrington [address]     8/7/2017 2:49 PM Follow up call to Kat Layton clinical liaison with [facility] to check on the pt's auth. Presently there has not been any response received on the auth from the [employer]. [employer] is following this process and will contact [address] [place] [address] Management once they have a response. JASWINDER Eldridge     8/7/2017 9:55 AM Follow up call to Kat Layton with [facility] this AM. The pt was accepted over the weekend. Kat Layton notes that [employer] will be initiated today. Care management to continue to follow.    JASWINDER Eldridge

## 2017-08-07 NOTE — PROGRESS NOTES
Problem: Mobility Impaired (Adult and Pediatric)  Goal: *Acute Goals and Plan of Care (Insert Text)  Physical Therapy Goals  Initiated 8/4/2017  1. Patient will move from supine to sit and sit to supine in bed with minimal assistance/contact guard assist within 7 day(s). 2. Patient will transfer from bed to chair and chair to bed with minimal assistance/contact guard assist using the least restrictive device within 7 day(s). 3. Patient will perform sit to stand with minimal assistance/contact guard assist within 7 day(s). 4. Patient will ambulate with minimal assistance/contact guard assist for 50 feet with the least restrictive device within 7 day(s). 5. Patient will ascend/descend 4 stairs with 1 handrail(s) with minimal assistance/contact guard assist within 7 day(s). 6. Patient will improve Stinson Balance score by 7 points within 7 days. PHYSICAL THERAPY TREATMENT  Patient: He Guardado (88 y.o. male)  Date: 8/7/2017  Diagnosis: Acute CVA (cerebrovascular accident) [facility]) <principal problem not specified>       Precautions: WBAT, Fall      ASSESSMENT:  Pt sitting up in chair and agreeable to participate with therapy this morning. Pt reports feeling that his R arm and hand are weaker today. Minimal  through ulnar side and trace contraction through thumb/radial side. Use of shoulder for forearm elevation. Required hand over hand placement of R UE to stand and  RW. Mod A to stand from low chair in room. Multiple attempts and verbal cues + downward force through R LE to come to standing. Use of RW today for gait d/t weakness. Once R hand placed on  pt was able to maintain positioning. Small shuffling steps with narrowed JUDY and step to pattern. Verbal and tactile cues to widen JUDY for stability with transient improvement. Ambulated with Min A around nurses station before returning to room and sitting up in chair with pillows to elevate seat for ease with transfers.  Educated on using R UE as much as possible and positioning techniques to avoid contractures as pt reports his fingers are beginning to become stiff. Inpatient rehab recommended at discharge. Pt would be a great candidate and can definitely tolerate 3 hours of therapy a day. Progression toward goals:  [X]    Improving appropriately and progressing toward goals  [ ]    Improving slowly and progressing toward goals  [ ]    Not making progress toward goals and plan of care will be adjusted       PLAN:  Patient continues to benefit from skilled intervention to address the above impairments. Continue treatment per established plan of care. Discharge Recommendations:  Inpatient Rehab  Further Equipment Recommendations for Discharge:  TBD       SUBJECTIVE:   Patient stated It feels better to be up.       OBJECTIVE DATA SUMMARY:   Critical Behavior:  Neurologic State: Alert  Orientation Level: Oriented X4  Cognition: Appropriate decision making  Safety/Judgement: Awareness of environment  Functional Mobility Training:  Bed Mobility:                    Transfers:  Sit to Stand: Moderate assistance (multiple attempts)  Stand to Sit: Minimum assistance                             Balance:  Sitting: Intact  Standing: Impaired  Standing - Static: Fair;Constant support  Standing - Dynamic : Poor  Ambulation/Gait Training:  Distance (ft): 100 Feet (ft)  Assistive Device: Walker, rolling;Gait belt  Ambulation - Level of Assistance: Minimal assistance; Additional time        Gait Abnormalities: Decreased step clearance; Step to gait                                         Activity Tolerance:   Good  Please refer to the flowsheet for vital signs taken during this treatment.   After treatment:   [X]    Patient left in no apparent distress sitting up in chair  [ ]    Patient left in no apparent distress in bed  [X]    Call bell left within reach  [X]    Nursing notified  [ ]    Caregiver present  [ ]    Bed alarm activated      COMMUNICATION/COLLABORATION:   The patients plan of care was discussed with: Occupational Therapist and Registered Nurse     Derek Wynn, PT   Time Calculation: 25 mins

## 2017-08-07 NOTE — PROGRESS NOTES
Hospitalist Progress Note    NAME: Catherine Trammell   :  1945   MRN:  [id number]       Assessment / Plan:     80 yo WM w/ hx of HTN, CUCO, depression admitted for acute CVA. Problems:      1. Acute CVA:  Presented with acute confusion/right sided weakness. Not deemed T-PA candidate. Head CT-negative. MRI brain-small areas of acute infarction in the left superior periventricular whitematter extending into the left posterior lentiform nucleus. Brain MRA-no evidence for acute large vessel arterial occlusion or significant stenosis. CTA-mild to moderate stenosis in the internal carotid artery on the left. Carotid dopplers unremarkable. TTE w/o shunt, preserved EF. Managing with Plavix/Statin. PT/ OT/ST. Stable neurologically. Improving right hemiparesis.      2. DM-2: New Dx. Random glucose 195 on admission. A1c 8.7%. Managing with diet/SSI/Glipizide.      3. Uncontrolled HTN: Allowed permissive HTN initially. Now on Verapamil/Lisinopril/HCTZ. Adjusting. BP reasonable.      4. CUCO: On nocturnal CPAP.      5. Depression: Stable on Zoloft. 6. Hypokalemia: Repleting as indicated. Following lytes.       6. Morbid Obesity: Would benefit from weight loss. Comment: Stable for DC to Rehab, as soon as bed available. Body mass index is 37.46 kg/(m^2). Code status: Full  Prophylaxis: SCD's  Recommended Disposition: SNF/LTC     Subjective:     Chief Complaint / Reason for Physician Visit  \"No new developments\". Discussed with RN events overnight. Review of Systems:  Symptom Y/N Comments  Symptom Y/N Comments   Fever/Chills N   Chest Pain N    Poor Appetite N   Edema N    Cough N   Abdominal Pain N    Sputum N   Joint Pain N    SOB/HARPER N   Pruritis/Rash N    Nausea/vomit N   Tolerating PT/OT Y    Diarrhea N   Tolerating Diet     Constipation N   Other       Could NOT obtain due to:      Objective:     VITALS:   Last 24hrs VS reviewed since prior progress note.  Most recent are:  Patient Vitals for the past 24 hrs:   Temp Pulse Resp BP SpO2   08/07/17 1500 - 69 - - -   08/07/17 1150 - - - 158/80 -   08/07/17 1122 98.7 °F (37.1 °C) 77 18 (!) 165/96 96 %   08/07/17 0807 98.3 °F (36.8 °C) 79 18 147/80 94 %   08/07/17 0422 - - - (!) 167/96 -   08/07/17 0359 97.8 °F (36.6 °C) 66 18 (!) 180/94 94 %   08/07/17 0020 97.6 °F (36.4 °C) 70 18 (!) 168/94 94 %   08/06/17 2301 - 72 - - -   08/06/17 2013 97.7 °F (36.5 °C) 66 18 183/84 93 %     No intake or output data in the 24 hours ending 08/07/17 1617     PHYSICAL EXAM:  General: WD, WN. Alert, cooperative, no acute distress    EENT:  EOMI. Anicteric sclerae. MMM  Resp:  CTA bilaterally, no wheezing or rales. No accessory muscle use  CV:  Regular  rhythm,  No edema  GI:  Soft, Non distended, Non tender.  +Bowel sounds  Neurologic:  Alert and oriented X 3, normal speech, stable right hemiparesis. Psych:   Good insight. Not anxious nor agitated  Skin:  No rashes. No jaundice    Reviewed most current lab test results and cultures  YES  Reviewed most current radiology test results   YES  Review and summation of old records today    NO  Reviewed patient's current orders and MAR    YES  PMH/SH reviewed - no change compared to H&P  ________________________________________________________________________  Care Plan discussed with:    Comments   [facility]     Consultant                        Multidiciplinary team rounds were held today with , nursing, pharmacist and clinical coordinator. Patient's plan of care was discussed; medications were reviewed and discharge planning was addressed.      ________________________________________________________________________  Total NON critical care TIME:  <30   Minutes    Total CRITICAL CARE TIME Spent:   Minutes non procedure based      Comments   >50% of visit spent in counseling and coordination of care     ________________________________________________________________________  [facility] Ambrocio Ahmadi MD     Procedures: see electronic medical records for all procedures/Xrays and details which were not copied into this note but were reviewed prior to creation of Plan. LABS:  I reviewed today's most current labs and imaging studies.   Pertinent labs include:  Recent Labs      08/07/17 0352 08/06/17 0424   WBC  7.4  6.9   HGB  15.5  15.2   HCT  45.2  45.5   PLT  163  166     Recent Labs      08/07/17 0352 08/06/17 0424 08/05/17   0555   NA  140  141  140   K  3.5  3.3*  3.6   CL  103  106  103   CO2  26  29  31   GLU  146*  145*  161*   BUN  20  19  14   CREA  1.07  1.14  1.17   CA  9.1  8.7  8.8       Signed: Edgardo Tidwell MD

## 2017-08-07 NOTE — PROGRESS NOTES
Nutrition Assessment:    RECOMMENDATIONS/INTERVENTION(S):   Continue NDD2 diet/ thin liquid per SLP. Monitor BG, wt, PO intakes. ASSESSMENT:   8/7: 81 yr old male admitted for CVA. PMHx: HTN. DM, sleep apnea. Pt currently on NDD2 / thin liquids per SLP. Pt eating 100% of meals. Appetite is good. Wt gain per chart over last couple years. BG are elevated. 146, 145, 161 mg/dL. Pt does not need ONS at this time. Continue to monitor PO intakes, wt, BG. Labs/meds reviewed. SUBJECTIVE/OBJECTIVE:   Diet Order: Mechanical soft  % Eaten:  Patient Vitals for the past 72 hrs:   % Diet Eaten   08/05/17 1244 100 %   08/05/17 0916 100 %     Pertinent Medications: [x] Reviewed    Chemistries:  Lab Results   Component Value Date/Time    Sodium 140 08/07/2017 03:52 AM    Potassium 3.5 08/07/2017 03:52 AM    Chloride 103 08/07/2017 03:52 AM    CO2 26 08/07/2017 03:52 AM    Anion gap 11 08/07/2017 03:52 AM    Glucose 146 08/07/2017 03:52 AM    BUN 20 08/07/2017 03:52 AM    Creatinine 1.07 08/07/2017 03:52 AM    BUN/Creatinine ratio 19 08/07/2017 03:52 AM    GFR est AA >60 08/07/2017 03:52 AM    GFR est non-AA >60 08/07/2017 03:52 AM    Calcium 9.1 08/07/2017 03:52 AM    AST (SGOT) 34 08/04/2017 12:53 AM    Alk. phosphatase 68 08/04/2017 12:53 AM    Protein, total 7.4 08/04/2017 12:53 AM    Albumin 3.9 08/04/2017 12:53 AM    Globulin 3.5 08/04/2017 12:53 AM    A-G Ratio 1.1 08/04/2017 12:53 AM    ALT (SGPT) 74 08/04/2017 12:53 AM      Anthropometrics: Height: 6' 1\" (185.4 cm) Weight: 128.8 kg (283 lb 15.2 oz)    IBW (%IBW):   ( ) UBW (%UBW):   (  %)    BMI: Body mass index is 37.46 kg/(m^2). This BMI is indicative of:   [] Underweight    [] Normal    [] Overweight    [x]  Obesity    []  Extreme Obesity (BMI>40)  Estimated Nutrition Needs (Based on): 7952 Kcals/day (UFD(1029I8. 3)-250) , 103 g (-129g/day(0.8-1.0g/kg) ActBW) Protein  Carbohydrate:  At Least 130 g/day  Fluids: 2450 mL/day    Last BM: 8/5   [x]Active []Hyperactive  []Hypoactive       [] Absent   BS  Skin:    [] Intact   [] Incision  [] Breakdown   [] DTI   [] Tears/Excoriation/Abrasion  []Edema [] Other:    Wt Readings from Last 30 Encounters:   08/04/17 128.8 kg (283 lb 15.2 oz)   08/06/15 115.7 kg (255 lb)   06/15/15 118.4 kg (261 lb)   03/15/14 118.4 kg (261 lb)   06/05/13 117.1 kg (258 lb 2 oz)   05/29/13 119.7 kg (264 lb)   12/14/12 114.3 kg (251 lb 15.8 oz)   12/07/12 114.3 kg (252 lb)   06/21/12 117 kg (258 lb)      NUTRITION DIAGNOSES:   Problem:  Chewing/masticatory difficulty     Etiology: related to motor causes altered swallow patterns     Signs/Symptoms: as evidenced by NDD2 thin liquids per SLP due to medical condition- CVA      NUTRITION INTERVENTIONS:  Meals/Snacks: General/healthful diet                  GOAL:   Pt will consume > 75% of meals within 3-5 days    Cultural, Episcopalian, or Ethnic Dietary Needs: None     LEARNING NEEDS (Diet, Food/Nutrient-Drug Interaction):    [x] None Identified   [] Identified and Education Provided/Documented   [] Identified and Pt declined/was not appropriate      [x] Interdisciplinary Care Plan Reviewed/Documented    [x] Discharge Needs:    TBD   [] No Nutrition Related Discharge Needs    NUTRITION RISK:   Pt Is At Nutrition Risk  [x]     No Nutrition Risk Identified  []       PT SEEN FOR:    []  MD Consult: []Calorie Count      []Diabetic Diet Education        []Diet Education     []Electrolyte Management     []General Nutrition Management and Supplements     []Management of Tube Feeding     []TPN Recommendations    []  RN Referral:  []MST score >=2     []Enteral/Parenteral Nutrition PTA     []Pregnant: Gestational DM or Multigestation                 [] Pressure Ulcer      []  Low BMI      []  Length of Stay       [x] Dysphagia Diet         [] Ventilator      []  Follow-Up      Previous Recommendations:   [] Implemented          [] Not Implemented          [x] Not Applicable    Previous Goal:   [] Met [] Progressing Towards Goal              [] Not Progressing Towards Goal   [x] Not Applicable              Erwin Mcneal, [address] [place] [address]   Pager [phone]

## 2017-08-07 NOTE — PROGRESS NOTES
Problem: Self Care Deficits Care Plan (Adult)  Goal: *Acute Goals and Plan of Care (Insert Text)  Occupational Therapy Goals  Initiated 8/4/2017  1. Patient will perform grooming with supervision/setup with best technique within 7 day(s). 2. Patient will perform upper body dressing and bathing with supervision/setup with best technique within 7 day(s). 3. Patient will perform lower body dressing and bathing with minimum assistance with best technique within 7 day(s). 4. Patient will perform toilet transfers with CGA with best technique within 7 day(s). 5 Patient will participate in upper extremity therapeutic exercise/activities with minimal assistance with focus on R, dominant UE strength, coordination, motor planning and sequencing within 7 day(s). 6. Patient will improve Fugl-Martinez Assessment of (R) Upper Extremity by 5 points within 7 day. OCCUPATIONAL THERAPY TREATMENT  Patient: Danae Yousif (89 y.o. male)  Date: 8/7/2017  Diagnosis: Acute CVA (cerebrovascular accident) [facility]) <principal problem not specified>       Precautions: WBAT, Fall      ASSESSMENT:  Patient received supine in bed, and agreeable to OT. He expressed concern for decreased movement in RUE. Patient able to come to sitting at EOB with moderate assistance and use of hospital bed for additional assistance. Once sitting, patient maintains good balance but noted significant change in RUE motor function since evaluation. Patient with score of 56/66 when evaluation completed on 8/4/2017, and now with score of 14/66, showing marked changes from mild motor function deficits, to severe deficits in RUE. Patient noted with swelling to R digits and today and education provided regarding elevation and gentle ROM to reduce. Patient educated on self ROM for RUE and patient able to complete with 100% accuracy at teach back . Patient educated to complete self ROM at digits, wrist, and elbow daily. Patient verbalized understanding.   Patient with questions regarding decreased function and therapist providing explanation to which patient verbalized understanding. Patient returned to supine with RUE elevated on pillows. Recommend inpatient rehab at discharge. Progression toward goals:  [ ]       Improving appropriately and progressing toward goals  [X]       Improving slowly and progressing toward goals  [ ]       Not making progress toward goals and plan of care will be adjusted       PLAN:  Patient continues to benefit from skilled intervention to address the above impairments. Continue treatment per established plan of care. Discharge Recommendations:  Inpatient Rehab  Further Equipment Recommendations for Discharge: TBD       SUBJECTIVE:   Patient stated I can't move as much.       OBJECTIVE DATA SUMMARY:   Cognitive/Behavioral Status:  Neurologic State: Alert  Orientation Level: Oriented X4  Cognition: Follows commands (increased time )  Perception: Appears intact  Perseveration: No perseveration noted  Safety/Judgement: Insight into deficits (for RUE )     Functional Mobility and Transfers for ADLs:  Bed Mobility:  Supine to Sit: Moderate assistance; Additional time (uses elevated mechanism of bed )  Sit to Supine: Assist x1;Minimum assistance (RLE to clear EOB)  Scooting: Contact guard assistance (scooting up to [facility] in sitting )     Transfers:  Sit to Stand:  Moderate assistance (multiple attempts)        Balance:  Sitting: Intact  Sitting - Static: Good (unsupported)  Standing:  (not tested today, sitting EOB for RUE intervention)  Standing - Static: Fair;Constant support  Standing - Dynamic : Poor     ADL Intervention:     Fugl-Martinez Assessment of Motor Recovery after Stroke:       Reflex Activity  Flexors/Biceps/Fingers: Can be elicited  Extensors/Triceps: Can be elicited  Reflex Subtotal: 4     Volitional Movement Within Synergies  Shoulder Retraction: Partial  Shoulder Elevation: Partial  Shoulder Abduction (90 degrees): None  Shoulder External Rotation: None  Elbow Flexion: None  Forearm Supination: None  Shoulder Adduction/Internal Rotation: Partial  Elbow Extension: None  Forearm Pronation: Partial  Subtotal: 4     Volitional Movement Mixing Synergies  Hand to Lumbar Spine: None  Shoulder Flexion (0-90 degrees): None  Pronation-Supination: None  Subtotal: 0     Volitional Movement With Little or No Synergy  Shoulder Abduction (0-90 degrees): None  Shoulder Flexion ( degrees): None  Pronation/Supination: None  Subtotal : 0     Normal Reflex Activity  Biceps, Triceps, Finger Flexors: Full  Subtotal : 2     Upper Extremity Total   Upper Extremity Total: 10     Wrist  Stability at 15 Degree Dorsiflexion: None  Repeated Dorsiflexion/ Volar Flexion: None  Stability at 15 Degree Dorsiflexion: None  Repeated Dorsiflexion/ Volar Flexion: None  Circumduction: None  Wrist Total: 0     Hand  Mass Flexion: None  Mass Extension: None  Grasp A: None  Grasp B: None  Grasp C: None  Grasp D: None  Grasp E: None  Hand Total: 0     Coordination/Speed  Tremor: None  Dysmetria: None (unable to complete task)  Time: >5s  Coordination/Speed Total : 4     Total A-D  Total A-D (Motor Function): 14/66         Percentage of impairment CH  0% CI  1-19% CJ  20-39% CK  40-59% CL  60-79% CM  80-99% CN  100%   Fugl-Martinez score: 0-66 66 53-65 39-52 26-38 13-25 1-12    0      This is a reliable/valid measure of arm function after a neurological event. It has established value to characterize functional status and for measuring spontaneous and therapy-induced recovery; tests proximal and distal motor functions. Fugl-Martinez Assessment  UE scores recorded between five and 30 days post neurologic event can be used to predict UE recovery at six months post neurologic event. Severe = 0-21 points   Moderately Severe = 22-33 points   Moderate = 34-47 points   Mild = 48-66 points  AUGUSTO Bunch, ZACK Kinsey, & XI Villanueva (1992).  Measurement of motor recovery after stroke: Outcome assessment and sample size requirements. Stroke, 23, pp. 6883-0304.   --------------------------------------------------------------------------------------------------------------------------------------------------------------------  MCID:  Stroke:   Kandis Bueno et al, 2001; n = 171; mean age 79 (5) years; assessed within 16 (12) days of stroke, Acute Stroke)  FMA Motor Scores from Admission to Discharge   · 10 point increase in FMA Upper Extremity = 1.5 change in discharge FIM  · 10 point increase in FMA Lower Extremity = 1.9 change in discharge FIM  MDC:   Stroke:   Ryan Napoles et al, 2008, n = 14, mean age = 59.9 (14.6) years, assessed on average 14 (6.5) months post stroke, Chronic Stroke)   · FMA = 5.2 points for the Upper Extremity portion of the assessment               Cognitive Retraining  Safety/Judgement: Insight into deficits (for RUE )     Neuro Re-Education:    Education + Instruction provided to patient with demonstration of good understanding and teach back of all information performed. Therapeutic Exercises:      Pain:  Pain Scale 1: Numeric (0 - 10)  Pain Intensity 1: 0              Activity Tolerance:   VSS  Please refer to the flowsheet for vital signs taken during this treatment.   After treatment:   [ ] Patient left in no apparent distress sitting up in chair  [X] Patient left in no apparent distress in bed  [X] Call bell left within reach  [X] Nursing notified  [ ] Caregiver present  [ ] Bed alarm activated      COMMUNICATION/COLLABORATION:   The patients plan of care was discussed with: Physical Therapist, Registered Nurse and      ANAND Fulton/L  Time Calculation: 17 mins

## 2017-08-07 NOTE — DISCHARGE SUMMARY
Hospitalist Discharge Summary     Patient ID:  Frankey Spates  [id number]  81 y.o.  1945    PCP on record: Leonardo Correa MD    Admit date: 8/3/2017  Discharge date and time: 8/9/2017      DISCHARGE DIAGNOSIS:    1. Acute CVA.      2. DM-2: New Dx.     3. Uncontrolled HTN.     4. CUCO.     5. Depression.    6. Hypokalemia.     7. Morbid Obesity. CONSULTATIONS:  IP CONSULT TO NEUROLOGY  IP CONSULT TO [facility] PHYSICIAN    Excerpted HPI from H&P of Idalia Warner MD:  80 yo WM w/ hx of HTN, CUCO, depression admitted for right-sided weakness, suspicious for acute CVA. ______________________________________________________________________  DISCHARGE SUMMARY/HOSPITAL COURSE:  for full details see H&P, daily progress notes, labs, consult notes.       1.  Acute CVA:  Presented with acute confusion/right sided weakness. Not deemed T-PA candidate. Head CT-negative. MRI brain-small areas of acute infarction in the left superior periventricular whitematter extending into the left posterior lentiform nucleus. Brain MRA-no evidence for acute large vessel arterial occlusion or significant stenosis. CTA-mild to moderate stenosis in the internal carotid artery on the left. Carotid dopplers unremarkable. TTE w/o shunt, preserved EF. Managed with Plavix/Statin, as well as PT/ OT/ST. Over the course of hospitalization, patient has shown significant improvement in right hemiparesis.       2. DM-2: This was a new diagnosis. No prior Hx of such. Random glucose 195 on admission. A1c 8.7%. Managed with diet/SSI/Glipizide.       3. Uncontrolled HTN: Allowed permissive HTN initially. Now on Verapamil/Lisinopril/HCTZ, and BP is reasonable, although further adjustment of antihypertensives may be required in due course.      4. CUCO: Stable on nocturnal CPAP.       5. Depression: Stable on Zoloft.      6. Hypokalemia: Repleted as indicated. Resolved.        6. Morbid Obesity: Would benefit from weight loss.  Counseled accordingly. Disposition: Patient clinically stable, and responding to PT/OT/ST. Stable for discharge to inpatient Rehab.       _______________________________________________________________________  Patient seen and examined by me on discharge day. Pertinent Findings:  Gen:    Not in distress  Chest: Clear lungs  CVS:   Regular rhythm. No edema  Abd:  Soft, not distended, not tender  Neuro:  Alert, fully oriented. Stable right hemiparesis. _______________________________________________________________________  DISCHARGE MEDICATIONS:   Current Discharge Medication List      START taking these medications    Details   atorvastatin (LIPITOR) 20 mg tablet Take 1 Tab by mouth nightly. Qty: 30 Tab, Refills: 0      clopidogrel (PLAVIX) 75 mg tab Take 1 Tab by mouth daily. Qty: 30 Tab, Refills: 0      hydroCHLOROthiazide (HYDRODIURIL) 12.5 mg tablet Take 1 Tab by mouth daily. Qty: 30 Tab, Refills: 0      verapamil ER (CALAN-SR) 240 mg CR tablet Take 1 Tab by mouth daily. Qty: 30 Tab, Refills: 0      glipiZIDE SR (GLUCOTROL XL) 2.5 mg CR tablet Take 1 Tab by mouth Daily (before breakfast). Qty: 30 Tab, Refills: 0      insulin lispro (HUMALOG) 100 unit/mL injection Sliding scale. Qty: 1 Vial, Refills: 0      lisinopril (PRINIVIL, ZESTRIL) 20 mg tablet Take 1 Tab by mouth daily. Qty: 30 Tab, Refills: 0      melatonin 3 mg tablet Take 1 Tab by mouth nightly as needed. Qty: 30 Tab, Refills: 0         CONTINUE these medications which have NOT CHANGED    Details   cholecalciferol, vitamin D3, (VITAMIN D3) 2,000 unit tab Take 1 Tab by mouth daily. fluticasone (FLONASE) 50 mcg/actuation nasal spray 2 Sprays by Both Nostrils route daily. aspirin delayed-release 81 mg tablet Take 81 mg by mouth daily. sertraline (ZOLOFT) 100 mg tablet Take 100 mg by mouth daily. verapamil CR (VERELAN) 180 mg CR capsule Take 180 mg by mouth daily.            STOP taking these medications       glucosamine (GLUCOSAMINE RELIEF) 1,000 mg tab Comments:   Reason for Stopping:         PLANT STANOL WILNER (CHOLEST OFF PO) Comments:   Reason for Stopping:               My Recommended Diet, Activity, Wound Care, and follow-up labs are listed in the patient's Discharge Insturctions which I have personally completed and reviewed.     _______________________________________________________________________  DISPOSITION:    Home with Family:    Home with HH/PT/OT/RN:    SNF/LTC:    MATTHEW:    OTHER: Y       Condition at Discharge:  Stable  _______________________________________________________________________  Follow up with:   PCP : Verónica Munoz MD  Follow-up Information     Follow up With Details Comments [address] ROMEO Infante MD In 5 days  [address],  Go on 8/22/2017 Neurology hospital follow-up post Stroke:  Arrival time: 0930 for 10am appt [place] [address] [facility] [address]  [address] [facility]  [phone]                Total time in minutes spent coordinating this discharge (includes going over instructions, follow-up, prescriptions, and preparing report for sign off to her PCP) :  <30 minutes    Signed:  Blanca Duncan MD

## 2017-08-07 NOTE — ROUTINE PROCESS
Bedside and Verbal shift change report given to Shanae Rosales RN (oncoming nurse) by Page Francisco RN (offgoing nurse). Report included the following information SBAR, Kardex, ED Summary, Procedure Summary, Intake/Output, MAR, Accordion, Recent Results and Med Rec Status.

## 2017-08-08 LAB
ANION GAP BLD CALC-SCNC: 11 MMOL/L (ref 5–15)
BASOPHILS # BLD AUTO: 0 K/UL (ref 0–0.1)
BASOPHILS # BLD: 1 % (ref 0–1)
BUN SERPL-MCNC: 19 MG/DL (ref 6–20)
BUN/CREAT SERPL: 18 (ref 12–20)
CALCIUM SERPL-MCNC: 9.1 MG/DL (ref 8.5–10.1)
CHLORIDE SERPL-SCNC: 101 MMOL/L (ref 97–108)
CO2 SERPL-SCNC: 27 MMOL/L (ref 21–32)
CREAT SERPL-MCNC: 1.08 MG/DL (ref 0.7–1.3)
EOSINOPHIL # BLD: 0.2 K/UL (ref 0–0.4)
EOSINOPHIL NFR BLD: 2 % (ref 0–7)
ERYTHROCYTE [DISTWIDTH] IN BLOOD BY AUTOMATED COUNT: 13.7 % (ref 11.5–14.5)
GLUCOSE BLD STRIP.AUTO-MCNC: 106 MG/DL (ref 65–100)
GLUCOSE BLD STRIP.AUTO-MCNC: 112 MG/DL (ref 65–100)
GLUCOSE BLD STRIP.AUTO-MCNC: 114 MG/DL (ref 65–100)
GLUCOSE BLD STRIP.AUTO-MCNC: 139 MG/DL (ref 65–100)
GLUCOSE SERPL-MCNC: 139 MG/DL (ref 65–100)
HCT VFR BLD AUTO: 45.5 % (ref 36.6–50.3)
HGB BLD-MCNC: 15.1 G/DL (ref 12.1–17)
LYMPHOCYTES # BLD AUTO: 30 % (ref 12–49)
LYMPHOCYTES # BLD: 1.9 K/UL (ref 0.8–3.5)
MCH RBC QN AUTO: 30.2 PG (ref 26–34)
MCHC RBC AUTO-ENTMCNC: 33.2 G/DL (ref 30–36.5)
MCV RBC AUTO: 91 FL (ref 80–99)
MONOCYTES # BLD: 0.7 K/UL (ref 0–1)
MONOCYTES NFR BLD AUTO: 10 % (ref 5–13)
NEUTS SEG # BLD: 3.7 K/UL (ref 1.8–8)
NEUTS SEG NFR BLD AUTO: 57 % (ref 32–75)
PLATELET # BLD AUTO: 161 K/UL (ref 150–400)
POTASSIUM SERPL-SCNC: 3.4 MMOL/L (ref 3.5–5.1)
RBC # BLD AUTO: 5 M/UL (ref 4.1–5.7)
SERVICE CMNT-IMP: ABNORMAL
SODIUM SERPL-SCNC: 139 MMOL/L (ref 136–145)
WBC # BLD AUTO: 6.4 K/UL (ref 4.1–11.1)

## 2017-08-08 PROCEDURE — 97116 GAIT TRAINING THERAPY: CPT

## 2017-08-08 PROCEDURE — 80048 BASIC METABOLIC PNL TOTAL CA: CPT | Performed by: INTERNAL MEDICINE

## 2017-08-08 PROCEDURE — 97530 THERAPEUTIC ACTIVITIES: CPT

## 2017-08-08 PROCEDURE — 74011250637 HC RX REV CODE- 250/637: Performed by: PSYCHIATRY & NEUROLOGY

## 2017-08-08 PROCEDURE — 85025 COMPLETE CBC W/AUTO DIFF WBC: CPT | Performed by: INTERNAL MEDICINE

## 2017-08-08 PROCEDURE — 74011250637 HC RX REV CODE- 250/637: Performed by: INTERNAL MEDICINE

## 2017-08-08 PROCEDURE — 74011250636 HC RX REV CODE- 250/636: Performed by: INTERNAL MEDICINE

## 2017-08-08 PROCEDURE — 82962 GLUCOSE BLOOD TEST: CPT

## 2017-08-08 PROCEDURE — 65660000000 HC RM CCU STEPDOWN

## 2017-08-08 PROCEDURE — 36415 COLL VENOUS BLD VENIPUNCTURE: CPT | Performed by: INTERNAL MEDICINE

## 2017-08-08 RX ADMIN — HYDROCHLOROTHIAZIDE 12.5 MG: 25 TABLET ORAL at 08:30

## 2017-08-08 RX ADMIN — VERAPAMIL HYDROCHLORIDE 240 MG: 240 TABLET, FILM COATED, EXTENDED RELEASE ORAL at 08:31

## 2017-08-08 RX ADMIN — LISINOPRIL 20 MG: 20 TABLET ORAL at 08:30

## 2017-08-08 RX ADMIN — Medication 10 ML: at 22:30

## 2017-08-08 RX ADMIN — ATORVASTATIN CALCIUM 20 MG: 20 TABLET, FILM COATED ORAL at 22:29

## 2017-08-08 RX ADMIN — CLOPIDOGREL 75 MG: 75 TABLET, FILM COATED ORAL at 08:31

## 2017-08-08 RX ADMIN — ENOXAPARIN SODIUM 40 MG: 40 INJECTION SUBCUTANEOUS at 17:38

## 2017-08-08 RX ADMIN — MELATONIN TAB 3 MG 3 MG: 3 TAB at 22:29

## 2017-08-08 RX ADMIN — SERTRALINE 100 MG: 50 TABLET, FILM COATED ORAL at 08:30

## 2017-08-08 RX ADMIN — Medication 10 ML: at 14:20

## 2017-08-08 NOTE — PROGRESS NOTES
Problem: Patient Education: Go to Patient Education Activity  Goal: Patient/Family Education  PHYSICAL THERAPY TREATMENT  Patient: Chani Dunn (53 y.o. male)  Date: 8/8/2017  Diagnosis: Acute CVA (cerebrovascular accident) [facility]) <principal problem not specified>       Precautions: WBAT, Fall  Chart, physical therapy assessment, plan of care and goals were reviewed. ASSESSMENT:  Pt comes to stand with mod assist.Pt ambulated 90ft with RW CGA to min assist.Pt with right side weakness. Pt unable to fully grasp RW with right hand. Pt needs assist directing RW through turns. Pt left sitting. Pt progressing with mobility. Pt is a good rehab candidate. Continue goals. Progression toward goals:  [ ]      Improving appropriately and progressing toward goals  [X]      Improving slowly and progressing toward goals  [ ]      Not making progress toward goals and plan of care will be adjusted       PLAN:  Patient continues to benefit from skilled intervention to address the above impairments. Continue treatment per established plan of care. Discharge Recommendations:  Inpatient Rehab  Further Equipment Recommendations for Discharge:  rolling walker       SUBJECTIVE:          OBJECTIVE DATA SUMMARY:   Critical Behavior:  Neurologic State: Alert  Orientation Level: Oriented X4  Cognition: Appropriate decision making  Safety/Judgement: Insight into deficits (for RUE )  Functional Mobility Training:                       Transfers:  Sit to Stand: Moderate assistance  Stand to Sit: Minimum assistance                             Balance:  Sitting: Intact  Standing: With support  Ambulation/Gait Training:  Distance (ft): 90 Feet (ft)  Assistive Device: Walker, rolling;Gait belt  Ambulation - Level of Assistance: Contact guard assistance;Minimal assistance        Gait Abnormalities: Decreased step clearance; Path deviations        Base of Support: Narrowed     Speed/Sumaya: Pace decreased (<100 feet/min)  Step Length: Left shortened;Right shortened                    Pain:                    Activity Tolerance:   Pt tolerated treatment well. Please refer to the flowsheet for vital signs taken during this treatment.   After treatment:   [X] Patient left in no apparent distress sitting up in chair  [ ] Patient left in no apparent distress in bed  [ ] Call bell left within reach  [ ] Nursing notified  [ ] Caregiver present  [ ] Bed alarm activated      COMMUNICATION/COLLABORATION:   The patients plan of care was discussed with: Physical Therapist     Warden Ana Maria PTA   Time Calculation: 23 mins

## 2017-08-08 NOTE — PROGRESS NOTES
Hospitalist Progress Note    NAME: Jesus Knox   :  1945   MRN:  [id number]       Assessment / Plan:     82 yo WM w/ hx of HTN, CUCO, depression admitted for acute CVA. Problems:      1. Acute CVA:  Presented with acute confusion/right sided weakness. Not deemed T-PA candidate. Head CT-negative. MRI brain-small areas of acute infarction in the left superior periventricular whitematter extending into the left posterior lentiform nucleus. Brain MRA-no evidence for acute large vessel arterial occlusion or significant stenosis. CTA-mild to moderate stenosis in the internal carotid artery on the left. Carotid dopplers unremarkable. TTE w/o shunt, preserved EF. Managing with Plavix/Statin. PT/ OT/ST. Stable neurologically. Improving right hemiparesis. Making steady progress.     2. DM-2: New Dx. Random glucose 195 on admission. A1c 8.7%. Managing with diet/SSI/Glipizide. Satisfactory control.      3. Uncontrolled HTN: Allowed permissive HTN initially. Now on Verapamil/Lisinopril/HCTZ. Adjusting. BP reasonable.      4. CUCO: On nocturnal CPAP.      5. Depression: Stable on Zoloft. 6. Hypokalemia: Repleting as indicated. Following lytes.       6. Morbid Obesity: Would benefit from weight loss. Comment: Stable for DC to Rehab, as soon as bed available. Body mass index is 37.46 kg/(m^2). Code status: Full  Prophylaxis: SCD's  Recommended Disposition: SNF/LTC     Subjective:     Chief Complaint / Reason for Physician Visit  \"No new issues\". Discussed with RN events overnight. Review of Systems:  Symptom Y/N Comments  Symptom Y/N Comments   Fever/Chills N   Chest Pain N    Poor Appetite N   Edema N    Cough N   Abdominal Pain N    Sputum N   Joint Pain N    SOB/HARPER N   Pruritis/Rash N    Nausea/vomit N   Tolerating PT/OT Y    Diarrhea N   Tolerating Diet     Constipation N   Other       Could NOT obtain due to:      Objective:     VITALS:   Last 24hrs VS reviewed since prior progress note. Most recent are:  Patient Vitals for the past 24 hrs:   Temp Pulse Resp BP SpO2   08/08/17 1647 98.4 °F (36.9 °C) 72 18 168/85 93 %   08/08/17 1121 98.7 °F (37.1 °C) 80 18 162/89 95 %   08/08/17 0819 98.5 °F (36.9 °C) 76 18 167/77 93 %   08/08/17 0412 97.9 °F (36.6 °C) 62 18 157/87 95 %   08/07/17 2358 - 66 - - -   08/07/17 2347 98.4 °F (36.9 °C) 70 18 129/85 97 %   08/07/17 1931 98.4 °F (36.9 °C) 73 18 154/86 95 %     No intake or output data in the 24 hours ending 08/08/17 1742     PHYSICAL EXAM:  General: WD, WN. Alert, cooperative, no acute distress    EENT:  EOMI. Anicteric sclerae. MMM  Resp:  CTA bilaterally, no wheezing or rales. No accessory muscle use  CV:  Regular  rhythm,  No edema  GI:  Soft, Non distended, Non tender.  +Bowel sounds  Neurologic:  Alert and oriented X 3, normal speech, stable right hemiparesis. Psych:   Good insight. Not anxious nor agitated  Skin:  No rashes. No jaundice    Reviewed most current lab test results and cultures  YES  Reviewed most current radiology test results   YES  Review and summation of old records today    NO  Reviewed patient's current orders and MAR    YES  PMH/SH reviewed - no change compared to H&P  ________________________________________________________________________  Care Plan discussed with:    Comments   [facility]     Consultant                        Multidiciplinary team rounds were held today with , nursing, pharmacist and clinical coordinator. Patient's plan of care was discussed; medications were reviewed and discharge planning was addressed.      ________________________________________________________________________  Total NON critical care TIME:  <30   Minutes    Total CRITICAL CARE TIME Spent:   Minutes non procedure based      Comments   >50% of visit spent in counseling and coordination of care     ________________________________________________________________________  [facility], MD Procedures: see electronic medical records for all procedures/Xrays and details which were not copied into this note but were reviewed prior to creation of Plan. LABS:  I reviewed today's most current labs and imaging studies.   Pertinent labs include:  Recent Labs      08/08/17 0425 08/07/17 0352 08/06/17 0424   WBC  6.4  7.4  6.9   HGB  15.1  15.5  15.2   HCT  45.5  45.2  45.5   PLT  161  163  166     Recent Labs      08/08/17 0425 08/07/17 0352 08/06/17 0424   NA  139  140  141   K  3.4*  3.5  3.3*   CL  101  103  106   CO2  27  26  29   GLU  139*  146*  145*   BUN  19  20  19   CREA  1.08  1.07  1.14   CA  9.1  9.1  8.7       Signed: Anne Nguyen MD

## 2017-08-08 NOTE — PROGRESS NOTES
8/8/2017   CARE MANAGEMENT NOTE:  CM spoke to liaison for [facility] and reportedly, pt has been accepted for inpt rehab. Authorization is required and is pending.   Sen

## 2017-08-08 NOTE — PROGRESS NOTES
Rounded on Judaism patients and provided Anointing of the Sick at request of patient    Fr. Satnam Rdz

## 2017-08-08 NOTE — PROGRESS NOTES
Bedside and Verbal shift change report given to Robinson Burks RN (oncoming nurse) by Rosa Maria Lau RN (offgoing nurse).  Report included the following information SBAR, Kardex, Intake/Output, MAR, Accordion and Recent Results

## 2017-08-09 ENCOUNTER — HOSPITAL ENCOUNTER (OUTPATIENT)
Age: 72
Discharge: HOME | End: 2017-08-25
Attending: PHYSICAL MEDICINE & REHABILITATION | Admitting: PHYSICAL MEDICINE & REHABILITATION

## 2017-08-09 VITALS
SYSTOLIC BLOOD PRESSURE: 159 MMHG | BODY MASS INDEX: 37.63 KG/M2 | DIASTOLIC BLOOD PRESSURE: 91 MMHG | RESPIRATION RATE: 18 BRPM | HEART RATE: 67 BPM | HEIGHT: 73 IN | TEMPERATURE: 98.4 F | WEIGHT: 283.95 LBS | OXYGEN SATURATION: 95 %

## 2017-08-09 LAB
ANION GAP BLD CALC-SCNC: 9 MMOL/L (ref 5–15)
APPEARANCE UR: CLEAR
BACTERIA URNS QL MICRO: NEGATIVE /HPF
BILIRUB UR QL: NEGATIVE
BUN SERPL-MCNC: 21 MG/DL (ref 6–20)
BUN/CREAT SERPL: 19 (ref 12–20)
CALCIUM SERPL-MCNC: 8.9 MG/DL (ref 8.5–10.1)
CHLORIDE SERPL-SCNC: 102 MMOL/L (ref 97–108)
CO2 SERPL-SCNC: 28 MMOL/L (ref 21–32)
COLOR UR: NORMAL
CREAT SERPL-MCNC: 1.1 MG/DL (ref 0.7–1.3)
EPITH CASTS URNS QL MICRO: NORMAL /LPF
GLUCOSE BLD STRIP.AUTO-MCNC: 119 MG/DL (ref 65–100)
GLUCOSE BLD STRIP.AUTO-MCNC: 154 MG/DL (ref 65–100)
GLUCOSE SERPL-MCNC: 149 MG/DL (ref 65–100)
GLUCOSE UR STRIP.AUTO-MCNC: NEGATIVE MG/DL
HGB UR QL STRIP: NEGATIVE
HYALINE CASTS URNS QL MICRO: NORMAL /LPF (ref 0–5)
KETONES UR QL STRIP.AUTO: NEGATIVE MG/DL
LEUKOCYTE ESTERASE UR QL STRIP.AUTO: NEGATIVE
NITRITE UR QL STRIP.AUTO: NEGATIVE
PH UR STRIP: 5.5 [PH] (ref 5–8)
POTASSIUM SERPL-SCNC: 3.4 MMOL/L (ref 3.5–5.1)
PROT UR STRIP-MCNC: NEGATIVE MG/DL
RBC #/AREA URNS HPF: NORMAL /HPF (ref 0–5)
SERVICE CMNT-IMP: ABNORMAL
SERVICE CMNT-IMP: ABNORMAL
SODIUM SERPL-SCNC: 139 MMOL/L (ref 136–145)
SP GR UR REFRACTOMETRY: 1.01 (ref 1–1.03)
UROBILINOGEN UR QL STRIP.AUTO: 1 EU/DL (ref 0.2–1)
WBC URNS QL MICRO: NORMAL /HPF (ref 0–4)

## 2017-08-09 PROCEDURE — 97116 GAIT TRAINING THERAPY: CPT

## 2017-08-09 PROCEDURE — 74011636637 HC RX REV CODE- 636/637: Performed by: INTERNAL MEDICINE

## 2017-08-09 PROCEDURE — 74011250637 HC RX REV CODE- 250/637: Performed by: PHYSICAL MEDICINE & REHABILITATION

## 2017-08-09 PROCEDURE — 74011250637 HC RX REV CODE- 250/637: Performed by: INTERNAL MEDICINE

## 2017-08-09 PROCEDURE — 97530 THERAPEUTIC ACTIVITIES: CPT

## 2017-08-09 PROCEDURE — 92507 TX SP LANG VOICE COMM INDIV: CPT

## 2017-08-09 PROCEDURE — 80048 BASIC METABOLIC PNL TOTAL CA: CPT | Performed by: INTERNAL MEDICINE

## 2017-08-09 PROCEDURE — 82962 GLUCOSE BLOOD TEST: CPT

## 2017-08-09 PROCEDURE — 74011250636 HC RX REV CODE- 250/636: Performed by: PHYSICAL MEDICINE & REHABILITATION

## 2017-08-09 PROCEDURE — 81001 URINALYSIS AUTO W/SCOPE: CPT | Performed by: PHYSICAL MEDICINE & REHABILITATION

## 2017-08-09 PROCEDURE — 36415 COLL VENOUS BLD VENIPUNCTURE: CPT | Performed by: INTERNAL MEDICINE

## 2017-08-09 PROCEDURE — 87086 URINE CULTURE/COLONY COUNT: CPT | Performed by: PHYSICAL MEDICINE & REHABILITATION

## 2017-08-09 RX ORDER — ATORVASTATIN CALCIUM 20 MG/1
20 TABLET, FILM COATED ORAL
Status: DISCONTINUED | OUTPATIENT
Start: 2017-08-09 | End: 2017-08-25 | Stop reason: HOSPADM

## 2017-08-09 RX ORDER — DEXTROSE 50 % IN WATER (D50W) INTRAVENOUS SYRINGE
25 AS NEEDED
Status: DISCONTINUED | OUTPATIENT
Start: 2017-08-09 | End: 2017-08-25 | Stop reason: HOSPADM

## 2017-08-09 RX ORDER — ADHESIVE BANDAGE
30 BANDAGE TOPICAL DAILY PRN
Status: DISCONTINUED | OUTPATIENT
Start: 2017-08-09 | End: 2017-08-25 | Stop reason: HOSPADM

## 2017-08-09 RX ORDER — POLYETHYLENE GLYCOL 3350 17 G/17G
17 POWDER, FOR SOLUTION ORAL DAILY
Status: DISCONTINUED | OUTPATIENT
Start: 2017-08-10 | End: 2017-08-25 | Stop reason: HOSPADM

## 2017-08-09 RX ORDER — DOCUSATE SODIUM 100 MG/1
100 CAPSULE, LIQUID FILLED ORAL 2 TIMES DAILY
Status: DISCONTINUED | OUTPATIENT
Start: 2017-08-09 | End: 2017-08-25 | Stop reason: HOSPADM

## 2017-08-09 RX ORDER — MAGNESIUM SULFATE 100 %
16 CRYSTALS MISCELLANEOUS AS NEEDED
Status: DISCONTINUED | OUTPATIENT
Start: 2017-08-09 | End: 2017-08-25 | Stop reason: HOSPADM

## 2017-08-09 RX ORDER — ATORVASTATIN CALCIUM 20 MG/1
20 TABLET, FILM COATED ORAL
Qty: 30 TAB | Refills: 0 | Status: SHIPPED
Start: 2017-08-09

## 2017-08-09 RX ORDER — HYDRALAZINE HYDROCHLORIDE 25 MG/1
25 TABLET, FILM COATED ORAL
Status: DISCONTINUED | OUTPATIENT
Start: 2017-08-09 | End: 2017-08-25 | Stop reason: HOSPADM

## 2017-08-09 RX ORDER — LISINOPRIL 20 MG/1
20 TABLET ORAL DAILY
Qty: 30 TAB | Refills: 0 | Status: SHIPPED
Start: 2017-08-09

## 2017-08-09 RX ORDER — FLUTICASONE PROPIONATE 50 MCG
2 SPRAY, SUSPENSION (ML) NASAL DAILY
Status: DISCONTINUED | OUTPATIENT
Start: 2017-08-10 | End: 2017-08-23

## 2017-08-09 RX ORDER — INSULIN LISPRO 100 [IU]/ML
INJECTION, SOLUTION INTRAVENOUS; SUBCUTANEOUS
Qty: 1 VIAL | Refills: 0 | Status: SHIPPED
Start: 2017-08-09 | End: 2017-09-06

## 2017-08-09 RX ORDER — LANOLIN ALCOHOL/MO/W.PET/CERES
3 CREAM (GRAM) TOPICAL
Status: DISCONTINUED | OUTPATIENT
Start: 2017-08-09 | End: 2017-08-25 | Stop reason: HOSPADM

## 2017-08-09 RX ORDER — HYDROCHLOROTHIAZIDE 12.5 MG/1
12.5 TABLET ORAL DAILY
Qty: 30 TAB | Refills: 0 | Status: SHIPPED
Start: 2017-08-09

## 2017-08-09 RX ORDER — GLIPIZIDE 2.5 MG/1
2.5 TABLET, EXTENDED RELEASE ORAL
Qty: 30 TAB | Refills: 0 | Status: SHIPPED
Start: 2017-08-09 | End: 2017-09-06

## 2017-08-09 RX ORDER — ENOXAPARIN SODIUM 100 MG/ML
40 INJECTION SUBCUTANEOUS EVERY 24 HOURS
Status: DISCONTINUED | OUTPATIENT
Start: 2017-08-09 | End: 2017-08-25 | Stop reason: HOSPADM

## 2017-08-09 RX ORDER — GLIPIZIDE 2.5 MG/1
2.5 TABLET, EXTENDED RELEASE ORAL
Status: DISCONTINUED | OUTPATIENT
Start: 2017-08-10 | End: 2017-08-25 | Stop reason: HOSPADM

## 2017-08-09 RX ORDER — VERAPAMIL HYDROCHLORIDE 240 MG/1
240 TABLET, FILM COATED, EXTENDED RELEASE ORAL DAILY
Status: DISCONTINUED | OUTPATIENT
Start: 2017-08-10 | End: 2017-08-25 | Stop reason: HOSPADM

## 2017-08-09 RX ORDER — LISINOPRIL 20 MG/1
20 TABLET ORAL DAILY
Status: DISCONTINUED | OUTPATIENT
Start: 2017-08-10 | End: 2017-08-25 | Stop reason: HOSPADM

## 2017-08-09 RX ORDER — HYDROCHLOROTHIAZIDE 25 MG/1
12.5 TABLET ORAL DAILY
Status: DISCONTINUED | OUTPATIENT
Start: 2017-08-10 | End: 2017-08-25 | Stop reason: HOSPADM

## 2017-08-09 RX ORDER — SERTRALINE HYDROCHLORIDE 50 MG/1
100 TABLET, FILM COATED ORAL DAILY
Status: DISCONTINUED | OUTPATIENT
Start: 2017-08-10 | End: 2017-08-25 | Stop reason: HOSPADM

## 2017-08-09 RX ORDER — CLOPIDOGREL BISULFATE 75 MG/1
75 TABLET ORAL DAILY
Status: DISCONTINUED | OUTPATIENT
Start: 2017-08-10 | End: 2017-08-25 | Stop reason: HOSPADM

## 2017-08-09 RX ORDER — VERAPAMIL HYDROCHLORIDE 240 MG/1
240 TABLET, FILM COATED, EXTENDED RELEASE ORAL DAILY
Qty: 30 TAB | Refills: 0 | Status: SHIPPED
Start: 2017-08-09

## 2017-08-09 RX ORDER — INSULIN LISPRO 100 [IU]/ML
INJECTION, SOLUTION INTRAVENOUS; SUBCUTANEOUS
Status: DISCONTINUED | OUTPATIENT
Start: 2017-08-09 | End: 2017-08-25 | Stop reason: HOSPADM

## 2017-08-09 RX ORDER — AMOXICILLIN 250 MG
1 CAPSULE ORAL
Status: DISCONTINUED | OUTPATIENT
Start: 2017-08-09 | End: 2017-08-25 | Stop reason: HOSPADM

## 2017-08-09 RX ORDER — FACIAL-BODY WIPES
10 EACH TOPICAL DAILY PRN
Status: DISCONTINUED | OUTPATIENT
Start: 2017-08-09 | End: 2017-08-25 | Stop reason: HOSPADM

## 2017-08-09 RX ORDER — LANOLIN ALCOHOL/MO/W.PET/CERES
3 CREAM (GRAM) TOPICAL
Qty: 30 TAB | Refills: 0 | Status: SHIPPED
Start: 2017-08-09

## 2017-08-09 RX ORDER — CLOPIDOGREL BISULFATE 75 MG/1
75 TABLET ORAL DAILY
Qty: 30 TAB | Refills: 0 | Status: SHIPPED
Start: 2017-08-09

## 2017-08-09 RX ORDER — ACETAMINOPHEN 325 MG/1
650 TABLET ORAL
Status: DISCONTINUED | OUTPATIENT
Start: 2017-08-09 | End: 2017-08-25 | Stop reason: HOSPADM

## 2017-08-09 RX ADMIN — CLOPIDOGREL 75 MG: 75 TABLET, FILM COATED ORAL at 08:07

## 2017-08-09 RX ADMIN — HYDRALAZINE HYDROCHLORIDE 25 MG: 25 TABLET, FILM COATED ORAL at 20:57

## 2017-08-09 RX ADMIN — MELATONIN TAB 3 MG 3 MG: 3 TAB at 20:54

## 2017-08-09 RX ADMIN — LISINOPRIL 20 MG: 20 TABLET ORAL at 08:07

## 2017-08-09 RX ADMIN — ATORVASTATIN CALCIUM 20 MG: 20 TABLET, FILM COATED ORAL at 20:58

## 2017-08-09 RX ADMIN — VERAPAMIL HYDROCHLORIDE 240 MG: 240 TABLET, FILM COATED, EXTENDED RELEASE ORAL at 08:07

## 2017-08-09 RX ADMIN — DOCUSATE SODIUM 100 MG: 100 CAPSULE ORAL at 20:54

## 2017-08-09 RX ADMIN — MAGNESIUM HYDROXIDE 30 ML: 400 SUSPENSION ORAL at 20:56

## 2017-08-09 RX ADMIN — Medication 10 ML: at 06:16

## 2017-08-09 RX ADMIN — SERTRALINE 100 MG: 50 TABLET, FILM COATED ORAL at 08:07

## 2017-08-09 RX ADMIN — GLIPIZIDE 2.5 MG: 2.5 TABLET, FILM COATED, EXTENDED RELEASE ORAL at 09:19

## 2017-08-09 RX ADMIN — DOCUSATE SODIUM -SENNOSIDES 1 TABLET: 50; 8.6 TABLET, COATED ORAL at 20:58

## 2017-08-09 RX ADMIN — HYDROCHLOROTHIAZIDE 12.5 MG: 25 TABLET ORAL at 08:07

## 2017-08-09 RX ADMIN — INSULIN LISPRO 2 UNITS: 100 INJECTION, SOLUTION INTRAVENOUS; SUBCUTANEOUS at 08:12

## 2017-08-09 RX ADMIN — ENOXAPARIN SODIUM 40 MG: 40 INJECTION SUBCUTANEOUS at 19:26

## 2017-08-09 NOTE — PROGRESS NOTES
Dr. Nathaniel Pulliam called to clarify patient taking aspirin after discharge as he has not been taking it here. MD states to NOT take aspirin. Marked it off discharge papers for patient, and patient verbalized understanding and actually knew not to take it. Discharge papers reviewed with patient to include new medications, and follow up appointments. He verbalized understanding.

## 2017-08-09 NOTE — PROGRESS NOTES
Problem: Mobility Impaired (Adult and Pediatric)  Goal: *Acute Goals and Plan of Care (Insert Text)  Physical Therapy Goals  Initiated 8/4/2017  1. Patient will move from supine to sit and sit to supine in bed with minimal assistance/contact guard assist within 7 day(s). 2. Patient will transfer from bed to chair and chair to bed with minimal assistance/contact guard assist using the least restrictive device within 7 day(s). 3. Patient will perform sit to stand with minimal assistance/contact guard assist within 7 day(s). 4. Patient will ambulate with minimal assistance/contact guard assist for 50 feet with the least restrictive device within 7 day(s). 5. Patient will ascend/descend 4 stairs with 1 handrail(s) with minimal assistance/contact guard assist within 7 day(s). 6. Patient will improve Stinson Balance score by 7 points within 7 days. PHYSICAL THERAPY TREATMENT  Patient: Kiesha Callejas (25 y.o. male)  Date: 8/9/2017  Diagnosis: Acute CVA (cerebrovascular accident) [facility]) <principal problem not specified>       Precautions: WBAT, Fall  Chart, physical therapy assessment, plan of care and goals were reviewed. ASSESSMENT:  Pt has difficulty coming to stand from chair with mod assist.Pt ambulated 90ft with RW min assist  And cues directing RW. Pt with weak grasp on right hand affecting control of RW. Pt with decreased control advancing right foot as he fatigues. Pt left sitting. Pt progressing with mobility. Continue goals. Progression toward goals:  [ ]      Improving appropriately and progressing toward goals  [ ]      Improving slowly and progressing toward goals  [ ]      Not making progress toward goals and plan of care will be adjusted       PLAN:  Patient continues to benefit from skilled intervention to address the above impairments. Continue treatment per established plan of care.   Discharge Recommendations:  Inpatient Rehab  Further Equipment Recommendations for Discharge:  rolling walker SUBJECTIVE:          OBJECTIVE DATA SUMMARY:   Critical Behavior:  Neurologic State: Alert  Orientation Level: Oriented X4  Cognition: Appropriate decision making  Safety/Judgement: Lack of insight into deficits  Functional Mobility Training:                                    Transfers:  Sit to Stand: mod assist  from chair                                Balance:  Sitting: Intact  Standing: Intact; With support  Ambulation/Gait Training:  Distance (ft): 90 Feet (ft)  Assistive Device: Walker, rolling;Gait belt  Ambulation - Level of Assistance: Minimal assistance                 Base of Support: Narrowed     Speed/Sumaya: Pace decreased (<100 feet/min)  Step Length: Left shortened;Right shortened                 Pain:  Pain Scale 1: Numeric (0 - 10)  Pain Intensity 1: 0              Activity Tolerance:   Pt tolerated treatment well. Please refer to the flowsheet for vital signs taken during this treatment.   After treatment:   [X] Patient left in no apparent distress sitting up in chair  [ ] Patient left in no apparent distress in bed  [ ] Call bell left within reach  [ ] Nursing notified  [ ] Caregiver present  [ ] Bed alarm activated      COMMUNICATION/COLLABORATION:   The patients plan of care was discussed with: Physical Therapist     Cj Suggs PTA   Time Calculation: 23 mins

## 2017-08-09 NOTE — PROGRESS NOTES
Speech language pathology treatment  Patient: Jesus Knox (4 y.o. male)  Date: 8/9/2017  Diagnosis: Acute CVA (cerebrovascular accident) [facility]) <principal problem not specified>         ASSESSMENT:  Patient with improved dysarthria-now mild hesitation. He still has mild expressive aphasia, with incomplete sentences occasionally, word-retrieval delays. Progression toward goals:  []       Improving appropriately and progressing toward goals  [x]       Improving slowly and progressing toward goals  []       Not making progress toward goals and plan of care will be adjusted     PLAN:  Patient continues to benefit from skilled intervention to address the above impairments. Continue treatment per established plan of care. Discharge Recommendations:  Inpatient Rehab     SUBJECTIVE:   Patient stated I can't think of it right now. OBJECTIVE:   Mental Status:  Neurologic State: Alert  Orientation Level: Oriented X4  Cognition: Appropriate decision making  Perception: Appears intact  Perseveration: No perseveration noted  Safety/Judgement: Lack of insight into deficits  Treatment & Interventions: Motor Speech:      Mild dysarthria. Patient with fast rate, some consonant spirantization. Hesitation at times. Language Comprehension and Expression:     Verbal Expression  Primary Mode of Expression: Verbal   noted intermittant word-retrieval issues at conversational level with incomplete sentences and delay in word-retrieval and thought organization. Would benefit from f/u SLP therapy.                                                              Neuro-Linguistics:                                                  Voice:        Response & Tolerance to Activities:     Pain:  Pain Scale 1: Numeric (0 - 10)  Pain Intensity 1: 0     After treatment:   []       Patient left in no apparent distress sitting up in chair  [x]       Patient left in no apparent distress in bed  []       Call bell left within reach  [x]       Nursing notified  []       Caregiver present  []       Bed alarm activated    COMMUNICATION/COLLABORATION:   Patient was educated regarding His deficit(s) of aphasia  as this relates to His diagnosis of CVA. He demonstrated Good understanding as evidenced by discussion. .    The patients plan of care was discussed with: Registered Nurse    Scott Sauceda SLP  Time Calculation: 20 mins

## 2017-08-09 NOTE — PROGRESS NOTES
TRANSFER - OUT REPORT:    Verbal report given to [address], RN(name) on Jodi Bustillo  being transferred to [facility](unit) for routine progression of care       Report consisted of patients Situation, Background, Assessment and   Recommendations(SBAR). Information from the following report(s) SBAR, Kardex, [facility] and Recent Results was reviewed with the receiving nurse. Opportunity for questions and clarification was provided.       Patient transported with:   [employer]

## 2017-08-09 NOTE — PROGRESS NOTES
Bedside and Verbal shift change report given to Mayelin Hernandez RN (oncoming nurse) by Maureen Bernardo RN (offgoing nurse). Report included the following information SBAR, Kardex, Intake/Output, MAR and Recent Results.

## 2017-08-09 NOTE — PROGRESS NOTES
Comment: No new issues today. Progressing with physiotherapy, and right hemiparesis is gradually improving. Examined today, and clinical data reviewed. Rehab bed now available. Stable for discharge to Rehab today. For further details, see Discharge Summary.

## 2017-08-09 NOTE — H&P
PHYSICAL MEDICINE AND REHABILITATION CONSULT      Patient: Jesus Knox  Admit Date: 8/3/2017  Admit Diagnosis: Acute CVA (cerebrovascular accident) [facility])  Admitting Physician: Henrik Shell MD  Referring Physician: Henrik Shell MD  Primary Care Physician: Kevyn Calloway MD  Treatment Team: Attending Provider: Sugey Lopez MD; Utilization Review: Anju Lunsford RN; Utilization Review: Madina Gentile; Care Manager: Jos Sherifftle    Date of Consultation:  August 9, 2017    Reason for consultation: We are being asked to render an opinion regarding the patient's rehab needs. IMPRESSION:   1. Past Medical History:   Diagnosis Date    Family history of skin cancer     Hypertension     Skin cancer     Squamous skin cancer on top of head    Stroke ([facility] [address] 2008    TIA    Sun-damaged skin     Sunburn, blistering     Unspecified sleep apnea     cpap         Recommendations:   1) Continue {REHAB THERAPY:28504864} therapies. Rehabilitation potential is {condition:74378} . He will benefit from acute inpatient rehabilitation to maximize function as well as continue monitoring and managing medically complex co-morbidities. Rehabilitation goal is {REHAB GOAL:63564427} in ADLs, transfers and ambulation. The expected length of stay is about {0-10:80543} {days/weeks:03738}. Various rehab options and program discussed with patient and ***. Continue {REHAB THERAPY:83369450} therapies. The patient does not meet acute inpatient criteria. He {REHAB ACUTE IP CRITERIA:28048059}  The patient will  be more appropriate for {REHAB PATIENT APPROPRIATE:13167503}. I discussed with *** regarding the various rehab options. 2) DVT prophylaxis: SCDs, jatinder hose,lovenox, warfarin, mobilization. 3) Bowel program: Continue current bowel regimen. 4) Pain management: continue ***.  5) Other issues:     Discussed with {Discussion with:70082175}. Thank you for this consultation.   .    CHIEF COMPLAINT: {REHAB CHIEF COMPLAINT:07326429}    HISTORY OF PRESENT ILLNESS:    Patient is a 81 y.o., {Righthanded/lefthanded:17284::\"right handed\"}, {Race/ethnicity:13225}, male,admitted for ***. Records reviewed. No other complaints or issues. Past Medical History:   Diagnosis Date    Family history of skin cancer     Hypertension     Skin cancer     Squamous skin cancer on top of head    Stroke ([facility] [address] 2008    TIA    Sun-damaged skin     Sunburn, blistering     Unspecified sleep apnea     cpap       Past Surgical History:   Procedure Laterality Date    HX KNEE ARTHROSCOPY Bilateral 2011    HX ORTHOPAEDIC  2004    TRIGGER FINGER-R HAND    HX ORTHOPAEDIC      finger - left       Family History   Problem Relation Age of Onset    Diabetes Mother     Cancer Mother      LUNG    Heart Disease Father     Cancer Sister      PANCREATIC    Cancer Sister      BRAIN    Malignant Hyperthermia Neg Hx     Pseudocholinesterase Deficiency Neg Hx     Delayed Awakening Neg Hx     Post-op Nausea/Vomiting Neg Hx     Emergence Delirium Neg Hx     Post-op Cognitive Dysfunction Neg Hx     Other Neg Hx           Functional History:   Premorbid - [unfilled] ***  Current level of function -  Bed Mobility Training  Rolling:  Moderate assistance, Assist x1, Additional time  Supine to Sit: Moderate assistance, Additional time (uses elevated mechanism of bed )  Sit to Supine: Assist x1, Minimum assistance (RLE to clear EOB)  Scooting: Contact guard assistance (scooting up to [facility] in sitting ), Transfer Training  Sit to Stand: Contact guard assistance  Stand to Sit: Minimum assistance  Bed to Chair: Moderate assistance, Assist x1,  , Gait Training  Assistive Device: Walker, rolling, Gait belt  Ambulation - Level of Assistance: Contact guard assistance, Minimal assistance  Distance (ft): 90 Feet (ft),   ,                       Allergies   Allergen Reactions    Codeine Nausea Only    Codeine Nausea Only      Prior to Admission medications Medication Sig Start Date End Date Taking? Authorizing Provider   atorvastatin (LIPITOR) 20 mg tablet Take 1 Tab by mouth nightly. 8/9/17  Yes Becky Melton MD   clopidogrel (PLAVIX) 75 mg tab Take 1 Tab by mouth daily. 8/9/17  Yes Becky Melton MD   hydroCHLOROthiazide (HYDRODIURIL) 12.5 mg tablet Take 1 Tab by mouth daily. 8/9/17  Yes Becky Melton MD   verapamil ER (CALAN-SR) 240 mg CR tablet Take 1 Tab by mouth daily. 8/9/17  Yes Becky Melton MD   glipiZIDE SR (GLUCOTROL XL) 2.5 mg CR tablet Take 1 Tab by mouth Daily (before breakfast). 8/9/17  Yes Becky Melton MD   insulin lispro (HUMALOG) 100 unit/mL injection Sliding scale. 8/9/17  Yes Becky Melton MD   lisinopril (PRINIVIL, ZESTRIL) 20 mg tablet Take 1 Tab by mouth daily. 8/9/17  Yes Becky Melton MD   melatonin 3 mg tablet Take 1 Tab by mouth nightly as needed. 8/9/17  Yes Becky Melton MD   cholecalciferol, vitamin D3, (VITAMIN D3) 2,000 unit tab Take 1 Tab by mouth daily. Yes Low Teresa MD   glucosamine (GLUCOSAMINE RELIEF) 1,000 mg tab Take 2 Tabs by mouth daily. Yes Low Teresa MD   fluticasone (FLONASE) 50 mcg/actuation nasal spray 2 Sprays by Both Nostrils route daily. Yes Historical Provider   aspirin delayed-release 81 mg tablet Take 81 mg by mouth daily. Yes Historical Provider   sertraline (ZOLOFT) 100 mg tablet Take 100 mg by mouth daily. 3/4/14  Yes Historical Provider   PLANT STANOL WILNER (CHOLEST OFF PO) Take 1 Tab by mouth daily. Yes Historical Provider   verapamil CR (VERELAN) 180 mg CR capsule Take 180 mg by mouth daily.      Yes Historical Provider     Current Facility-Administered Medications   Medication Dose Route Frequency    lisinopril (PRINIVIL, ZESTRIL) tablet 20 mg  20 mg Oral DAILY    hydroCHLOROthiazide (HYDRODIURIL) tablet 12.5 mg  12.5 mg Oral DAILY    insulin lispro (HUMALOG) injection   SubCUTAneous AC&HS    glucose chewable tablet 16 g  4 Tab Oral PRN    dextrose (D50W) injection syrg 12.5-25 g  12.5-25 g IntraVENous PRN    glucagon (GLUCAGEN) injection 1 mg  1 mg IntraMUSCular PRN    glipiZIDE SR (GLUCOTROL XL) tablet 2.5 mg  2.5 mg Oral ACB    verapamil ER (CALAN-SR) tablet 240 mg  240 mg Oral DAILY    hydrALAZINE (APRESOLINE) 20 mg/mL injection 10 mg  10 mg IntraVENous Q6H PRN    melatonin tablet 3 mg  3 mg Oral QHS PRN    sodium chloride (NS) flush 5-10 mL  5-10 mL IntraVENous Q8H    sodium chloride (NS) flush 5-10 mL  5-10 mL IntraVENous PRN    naloxone (NARCAN) injection 0.4 mg  0.4 mg IntraVENous PRN    sertraline (ZOLOFT) tablet 100 mg  100 mg Oral DAILY    clopidogrel (PLAVIX) tablet 75 mg  75 mg Oral DAILY    enoxaparin (LOVENOX) injection 40 mg  40 mg SubCUTAneous Q24H    atorvastatin (LIPITOR) tablet 20 mg  20 mg Oral QHS       Review of Systems:     []             Unable to obtain ROS due to     []            mental status   []            sedated   []            intubated   []             Total of 12 systems reviewed as follows:  Constitutional: negative fever, negative chills, negative weight loss, appetite ***  Eyes:   negative visual changes  ENT:   negative difficulty swallowing, sore throat, tongue or lip swelling  Respiratory:  negative cough, negative dyspnea  Cards:  negative for chest pain, palpitations, lower extremity edema  GI:   negative for nausea, vomiting, diarrhea, and abdominal pain, LBM =  , flatus  :  negative for frequency, dysuria or hematuria  Integument:  negative for rash and pruritus  Heme:  negative for easy bruising and bleeding  Musculoskel: negative for myalgias, neck pain,  back pain   Neuro:  negative for headaches, dizziness, vertigo, numbness or tingling, weakness  Psych:  negative for feelings of anxiety, depression     Physical Exam:  General:  Vital Signs:      Skin:   HEENT:  Neck: Alert, not in distress. Blood pressure (!) 159/91, pulse 67, temperature 98.4 °F (36.9 °C), resp.  rate 18, height 6' 1\" (1.854 m), weight 128.8 kg (283 lb 15.2 oz), SpO2 95 %., BMI (calculated): 37.5 (08/04/17 0456)     No rashes, lesions,  Incision - with dressing, c/d/i  PERRL, anicteric sclerae, oropharynx clear  Supple, thyroid not palpable   Lungs:   Clear to auscultation bilaterally. Heart:  Regular rate and rhythm, no murmur, click, rub or gallop. Abdomen:   Soft, non-tender. Bowel sounds present. Genitourinary:  Musculoskeletal: No hinson Hinsno catheter in place, draining clear, yellow urine. Cervical, thoracic and lumbar spine nontender. Calves soft, nontender. No lower extremity edema. ROM within functional limits for all limbs. Neuro:                Psych:     Speech and language clear and fluent, Oriented x4, memory and cognition intact, follows all 1- and 2-step verbal directives. Cranial nerves II-XII: intact. Sensory: intact to light touch in all limbs, decreased light touch in a glove and stocking distribution; Motor: 5/5 in all 4 extremities, no pronator drift or footdrop. Reflexes: bilaterally with down going toes, no ankle clonus, or Tromner's sign. Coordination: FTN, HTS intact without ataxia. Gait: Normal steady gait with good posture and arm swing. Pleasant and cooperative, no anxiety or agitation       Labs/Studies:  Recent Labs      08/08/17 0425  08/07/17   0352   WBC  6.4  7.4   HGB  15.1  15.5   HCT  45.5  45.2   PLT  161  163     Recent Labs      08/09/17   0229  08/08/17   0425  08/07/17   0352   NA  139  139  140   K  3.4*  3.4*  3.5   CL  102  101  103   CO2  28  27  26   GLU  149*  139*  146*   BUN  21*  19  20   CREA  1.10  1.08  1.07   CA  8.9  9.1  9.1     No results for input(s): TROIQ, CPK, CKMB in the last 72 hours.   No results found for: COLOR, APPRN, SPGRU, REFSG, ALLISON, PROTU, GLUCU, KETU, BILU, UROU, CLIFFORD, LEUKU, GLUKE, EPSU, BACTU, WBCU, RBCU, CASTS, UCRY  No results found for: SDES, CULT    IMAGING: ***        Signed By: Gi Buenrostro MD     August 9, 2017                   PHYSICAL MEDICINE AND REHABILITATION CONSULT      Patient: Layla Villanueva  Admit Date: 8/3/2017  Admit Diagnosis: Acute CVA (cerebrovascular accident) [facility])  Admitting Physician: Jewels Swenson MD  Referring Physician: Jewels Swenson MD  Primary Care Physician: Sean Perkins MD  Treatment Team: Attending Provider: Mathew Mckeon MD; Utilization Review: Mehran Mendoza RN; Utilization Review: Dionna Cruz; Care Manager: Deena Lopez    Date of Consultation:  August 9, 2017    Reason for consultation: We are being asked to render an opinion regarding the patient's rehab needs. IMPRESSION:   2. Past Medical History:   Diagnosis Date    Family history of skin cancer     Hypertension     Skin cancer     Squamous skin cancer on top of head    Stroke ([facility] [address] 2008    TIA    Sun-damaged skin     Sunburn, blistering     Unspecified sleep apnea     cpap         Recommendations:   1) Continue {REHAB THERAPY:61753307} therapies. Rehabilitation potential is {condition:19959} . He will benefit from acute inpatient rehabilitation to maximize function as well as continue monitoring and managing medically complex co-morbidities. Rehabilitation goal is {REHAB GOAL:68532695} in ADLs, transfers and ambulation. The expected length of stay is about {0-10:88549} {days/weeks:66492}. Various rehab options and program discussed with patient and ***. Continue {REHAB THERAPY:27680367} therapies. The patient does not meet acute inpatient criteria. He {REHAB ACUTE IP CRITERIA:86927690}  The patient will  be more appropriate for {REHAB PATIENT APPROPRIATE:32689232}. I discussed with *** regarding the various rehab options. 2) DVT prophylaxis: SCDs, jatinder hose,lovenox, warfarin, mobilization. 3) Bowel program: Continue current bowel regimen. 4) Pain management: continue ***.  5) Other issues:     Discussed with {Discussion with:09260145}. Thank you for this consultation.   .    CHIEF COMPLAINT: {REHAB CHIEF COMPLAINT:78398109}    HISTORY OF PRESENT ILLNESS:    Patient is a 81 y.o., {Righthanded/lefthanded:52429::\"right handed\"}, {Race/ethnicity:74154}, male,admitted for ***. Records reviewed. No other complaints or issues. Past Medical History:   Diagnosis Date    Family history of skin cancer     Hypertension     Skin cancer     Squamous skin cancer on top of head    Stroke ([facility] [address] 2008    TIA    Sun-damaged skin     Sunburn, blistering     Unspecified sleep apnea     cpap       Past Surgical History:   Procedure Laterality Date    HX KNEE ARTHROSCOPY Bilateral 2011    HX ORTHOPAEDIC  2004    TRIGGER FINGER-R HAND    HX ORTHOPAEDIC      finger - left       Family History   Problem Relation Age of Onset    Diabetes Mother     Cancer Mother      LUNG    Heart Disease Father     Cancer Sister      PANCREATIC    Cancer Sister      BRAIN    Malignant Hyperthermia Neg Hx     Pseudocholinesterase Deficiency Neg Hx     Delayed Awakening Neg Hx     Post-op Nausea/Vomiting Neg Hx     Emergence Delirium Neg Hx     Post-op Cognitive Dysfunction Neg Hx     Other Neg Hx       Social History   Substance Use Topics    Smoking status: Former Smoker    Smokeless tobacco: Never Used    Alcohol use 1.5 oz/week     3 Glasses of wine per week      Comment: Rare       Functional History:   Premorbid - [unfilled] ***  Current level of function -  Bed Mobility Training  Rolling:  Moderate assistance, Assist x1, Additional time  Supine to Sit: Moderate assistance, Additional time (uses elevated mechanism of bed )  Sit to Supine: Assist x1, Minimum assistance (RLE to clear EOB)  Scooting: Contact guard assistance (scooting up to [facility] in sitting ), Transfer Training  Sit to Stand: Contact guard assistance  Stand to Sit: Minimum assistance  Bed to Chair: Moderate assistance, Assist x1,  , Gait Training  Assistive Device: Walker, rolling, Gait belt  Ambulation - Level of Assistance: Contact guard assistance, Minimal assistance  Distance (ft): 90 Feet (ft),   ,                       Allergies   Allergen Reactions    Codeine Nausea Only    Codeine Nausea Only      Prior to Admission medications    Medication Sig Start Date End Date Taking? Authorizing Provider   atorvastatin (LIPITOR) 20 mg tablet Take 1 Tab by mouth nightly. 8/9/17  Yes Telma Cameron MD   clopidogrel (PLAVIX) 75 mg tab Take 1 Tab by mouth daily. 8/9/17  Yes Telma Cameron MD   hydroCHLOROthiazide (HYDRODIURIL) 12.5 mg tablet Take 1 Tab by mouth daily. 8/9/17  Yes Telma Cameron MD   verapamil ER (CALAN-SR) 240 mg CR tablet Take 1 Tab by mouth daily. 8/9/17  Yes Telma Cameron MD   glipiZIDE SR (GLUCOTROL XL) 2.5 mg CR tablet Take 1 Tab by mouth Daily (before breakfast). 8/9/17  Yes Telma Cameron MD   insulin lispro (HUMALOG) 100 unit/mL injection Sliding scale. 8/9/17  Yes Telma Cameron MD   lisinopril (PRINIVIL, ZESTRIL) 20 mg tablet Take 1 Tab by mouth daily. 8/9/17  Yes Telma Cameron MD   melatonin 3 mg tablet Take 1 Tab by mouth nightly as needed. 8/9/17  Yes Telma Cameron MD   cholecalciferol, vitamin D3, (VITAMIN D3) 2,000 unit tab Take 1 Tab by mouth daily. Yes Low Teresa MD   glucosamine (GLUCOSAMINE RELIEF) 1,000 mg tab Take 2 Tabs by mouth daily. Yes Low Teresa MD   fluticasone (FLONASE) 50 mcg/actuation nasal spray 2 Sprays by Both Nostrils route daily. Yes Historical Provider   aspirin delayed-release 81 mg tablet Take 81 mg by mouth daily. Yes Historical Provider   sertraline (ZOLOFT) 100 mg tablet Take 100 mg by mouth daily. 3/4/14  Yes Historical Provider   PLANT STANOL WILNER (CHOLEST OFF PO) Take 1 Tab by mouth daily. Yes Historical Provider   verapamil CR (VERELAN) 180 mg CR capsule Take 180 mg by mouth daily.      Yes Historical Provider     Current Facility-Administered Medications   Medication Dose Route Frequency    lisinopril (PRINIVIL, ZESTRIL) tablet 20 mg  20 mg Oral DAILY    hydroCHLOROthiazide (HYDRODIURIL) tablet 12.5 mg  12.5 mg Oral DAILY    insulin lispro (HUMALOG) injection   SubCUTAneous AC&HS    glucose chewable tablet 16 g  4 Tab Oral PRN    dextrose (D50W) injection syrg 12.5-25 g  12.5-25 g IntraVENous PRN    glucagon (GLUCAGEN) injection 1 mg  1 mg IntraMUSCular PRN    glipiZIDE SR (GLUCOTROL XL) tablet 2.5 mg  2.5 mg Oral ACB    verapamil ER (CALAN-SR) tablet 240 mg  240 mg Oral DAILY    hydrALAZINE (APRESOLINE) 20 mg/mL injection 10 mg  10 mg IntraVENous Q6H PRN    melatonin tablet 3 mg  3 mg Oral QHS PRN    sodium chloride (NS) flush 5-10 mL  5-10 mL IntraVENous Q8H    sodium chloride (NS) flush 5-10 mL  5-10 mL IntraVENous PRN    naloxone (NARCAN) injection 0.4 mg  0.4 mg IntraVENous PRN    sertraline (ZOLOFT) tablet 100 mg  100 mg Oral DAILY    clopidogrel (PLAVIX) tablet 75 mg  75 mg Oral DAILY    enoxaparin (LOVENOX) injection 40 mg  40 mg SubCUTAneous Q24H    atorvastatin (LIPITOR) tablet 20 mg  20 mg Oral QHS       Review of Systems:     []             Unable to obtain ROS due to     []            mental status   []            sedated   []            intubated   []             Total of 12 systems reviewed as follows:  Constitutional: negative fever, negative chills, negative weight loss, appetite ***  Eyes:   negative visual changes  ENT:   negative difficulty swallowing, sore throat, tongue or lip swelling  Respiratory:  negative cough, negative dyspnea  Cards:  negative for chest pain, palpitations, lower extremity edema  GI:   negative for nausea, vomiting, diarrhea, and abdominal pain, LBM =  , flatus  :  negative for frequency, dysuria or hematuria  Integument:  negative for rash and pruritus  Heme:  negative for easy bruising and bleeding  Musculoskel: negative for myalgias, neck pain,  back pain   Neuro:  negative for headaches, dizziness, vertigo, numbness or tingling, weakness  Psych:  negative for feelings of anxiety, depression Physical Exam:  General:  Vital Signs:      Skin:   HEENT:  Neck: Alert, not in distress. Blood pressure (!) 159/91, pulse 67, temperature 98.4 °F (36.9 °C), resp. rate 18, height 6' 1\" (1.854 m), weight 128.8 kg (283 lb 15.2 oz), SpO2 95 %., BMI (calculated): 37.5 (08/04/17 0456)     No rashes, lesions,  Incision - with dressing, c/d/i  PERRL, anicteric sclerae, oropharynx clear  Supple, thyroid not palpable   Lungs:   Clear to auscultation bilaterally. Heart:  Regular rate and rhythm, no murmur, click, rub or gallop. Abdomen:   Soft, non-tender. Bowel sounds present. Genitourinary:  Musculoskeletal: No hinson Hinson catheter in place, draining clear, yellow urine. Cervical, thoracic and lumbar spine nontender. Calves soft, nontender. No lower extremity edema. ROM within functional limits for all limbs. Neuro:                Psych:     Speech and language clear and fluent, Oriented x4, memory and cognition intact, follows all 1- and 2-step verbal directives. Cranial nerves II-XII: intact. Sensory: intact to light touch in all limbs, decreased light touch in a glove and stocking distribution; Motor: 5/5 in all 4 extremities, no pronator drift or footdrop. Reflexes: bilaterally with down going toes, no ankle clonus, or Tromner's sign. Coordination: FTN, HTS intact without ataxia. Gait: Normal steady gait with good posture and arm swing. Pleasant and cooperative, no anxiety or agitation       Labs/Studies:  Recent Labs      08/08/17 0425 08/07/17 0352   WBC  6.4  7.4   HGB  15.1  15.5   HCT  45.5  45.2   PLT  161  163     Recent Labs      08/09/17   0229  08/08/17 0425 08/07/17 0352   NA  139  139  140   K  3.4*  3.4*  3.5   CL  102  101  103   CO2  28  27  26   GLU  149*  139*  146*   BUN  21*  19  20   CREA  1.10  1.08  1.07   CA  8.9  9.1  9.1     No results for input(s): TROIQ, CPK, CKMB in the last 72 hours.   No results found for: COLOR, APPRN, SPGRU, REFSG, ALLISON, PROTU, GLUCU, KETU, [place]gangie Mention, CLIFFORD, LEUKU, GLUKE, EPSU, BACTU, WBCU, RBCU, CASTS, UCRY  No results found for: SDES, CULT    IMAGING: ***        Signed By: Cheryl Sheffield MD     August 9, 2017

## 2017-08-10 LAB
25(OH)D3 SERPL-MCNC: 25.1 NG/ML (ref 30–100)
ALBUMIN SERPL BCP-MCNC: 3.9 G/DL (ref 3.5–5)
ALBUMIN/GLOB SERPL: 1.1 {RATIO} (ref 1.1–2.2)
ALP SERPL-CCNC: 68 U/L (ref 45–117)
ALT SERPL-CCNC: 85 U/L (ref 12–78)
ANION GAP BLD CALC-SCNC: 8 MMOL/L (ref 5–15)
AST SERPL W P-5'-P-CCNC: 34 U/L (ref 15–37)
BILIRUB SERPL-MCNC: 0.8 MG/DL (ref 0.2–1)
BUN SERPL-MCNC: 22 MG/DL (ref 6–20)
BUN/CREAT SERPL: 18 (ref 12–20)
CALCIUM SERPL-MCNC: 8.8 MG/DL (ref 8.5–10.1)
CHLORIDE SERPL-SCNC: 101 MMOL/L (ref 97–108)
CO2 SERPL-SCNC: 29 MMOL/L (ref 21–32)
CREAT SERPL-MCNC: 1.25 MG/DL (ref 0.7–1.3)
ERYTHROCYTE [DISTWIDTH] IN BLOOD BY AUTOMATED COUNT: 13.5 % (ref 11.5–14.5)
GLOBULIN SER CALC-MCNC: 3.7 G/DL (ref 2–4)
GLUCOSE SERPL-MCNC: 117 MG/DL (ref 65–100)
HCT VFR BLD AUTO: 44.7 % (ref 36.6–50.3)
HGB BLD-MCNC: 15.1 G/DL (ref 12.1–17)
MAGNESIUM SERPL-MCNC: 2.6 MG/DL (ref 1.6–2.4)
MCH RBC QN AUTO: 30.4 PG (ref 26–34)
MCHC RBC AUTO-ENTMCNC: 33.8 G/DL (ref 30–36.5)
MCV RBC AUTO: 90.1 FL (ref 80–99)
PLATELET # BLD AUTO: 199 K/UL (ref 150–400)
POTASSIUM SERPL-SCNC: 3.6 MMOL/L (ref 3.5–5.1)
PROT SERPL-MCNC: 7.6 G/DL (ref 6.4–8.2)
RBC # BLD AUTO: 4.96 M/UL (ref 4.1–5.7)
SODIUM SERPL-SCNC: 138 MMOL/L (ref 136–145)
WBC # BLD AUTO: 7.5 K/UL (ref 4.1–11.1)

## 2017-08-10 PROCEDURE — 74011250637 HC RX REV CODE- 250/637: Performed by: PHYSICAL MEDICINE & REHABILITATION

## 2017-08-10 PROCEDURE — 82306 VITAMIN D 25 HYDROXY: CPT | Performed by: PHYSICAL MEDICINE & REHABILITATION

## 2017-08-10 PROCEDURE — 36415 COLL VENOUS BLD VENIPUNCTURE: CPT | Performed by: PHYSICAL MEDICINE & REHABILITATION

## 2017-08-10 PROCEDURE — 85027 COMPLETE CBC AUTOMATED: CPT | Performed by: PHYSICAL MEDICINE & REHABILITATION

## 2017-08-10 PROCEDURE — 80053 COMPREHEN METABOLIC PANEL: CPT | Performed by: PHYSICAL MEDICINE & REHABILITATION

## 2017-08-10 PROCEDURE — 74011250636 HC RX REV CODE- 250/636: Performed by: PHYSICAL MEDICINE & REHABILITATION

## 2017-08-10 PROCEDURE — 83735 ASSAY OF MAGNESIUM: CPT | Performed by: PHYSICAL MEDICINE & REHABILITATION

## 2017-08-10 PROCEDURE — 74011636637 HC RX REV CODE- 636/637: Performed by: PHYSICAL MEDICINE & REHABILITATION

## 2017-08-10 RX ADMIN — GLIPIZIDE 2.5 MG: 2.5 TABLET, EXTENDED RELEASE ORAL at 08:45

## 2017-08-10 RX ADMIN — HYDROCHLOROTHIAZIDE 12.5 MG: 25 TABLET ORAL at 08:46

## 2017-08-10 RX ADMIN — INSULIN LISPRO 2 UNITS: 100 INJECTION, SOLUTION INTRAVENOUS; SUBCUTANEOUS at 22:08

## 2017-08-10 RX ADMIN — MELATONIN TAB 3 MG 3 MG: 3 TAB at 22:08

## 2017-08-10 RX ADMIN — SERTRALINE 100 MG: 50 TABLET, FILM COATED ORAL at 08:46

## 2017-08-10 RX ADMIN — DOCUSATE SODIUM 100 MG: 100 CAPSULE ORAL at 08:46

## 2017-08-10 RX ADMIN — LISINOPRIL 20 MG: 20 TABLET ORAL at 08:46

## 2017-08-10 RX ADMIN — ENOXAPARIN SODIUM 40 MG: 40 INJECTION SUBCUTANEOUS at 17:28

## 2017-08-10 RX ADMIN — VERAPAMIL HYDROCHLORIDE 240 MG: 240 TABLET, FILM COATED, EXTENDED RELEASE ORAL at 08:45

## 2017-08-10 RX ADMIN — ATORVASTATIN CALCIUM 20 MG: 20 TABLET, FILM COATED ORAL at 22:08

## 2017-08-10 RX ADMIN — DOCUSATE SODIUM -SENNOSIDES 1 TABLET: 50; 8.6 TABLET, COATED ORAL at 22:08

## 2017-08-10 RX ADMIN — FLUTICASONE PROPIONATE 2 SPRAY: 50 SPRAY, METERED NASAL at 08:46

## 2017-08-10 RX ADMIN — DOCUSATE SODIUM 100 MG: 100 CAPSULE ORAL at 22:07

## 2017-08-10 RX ADMIN — CLOPIDOGREL 75 MG: 75 TABLET, FILM COATED ORAL at 08:45

## 2017-08-10 RX ADMIN — POLYETHYLENE GLYCOL (3350) 17 G: 17 POWDER, FOR SOLUTION ORAL at 08:46

## 2017-08-11 LAB
BACTERIA SPEC CULT: NORMAL
CC UR VC: NORMAL
SERVICE CMNT-IMP: NORMAL

## 2017-08-11 PROCEDURE — 74011250637 HC RX REV CODE- 250/637: Performed by: PHYSICAL MEDICINE & REHABILITATION

## 2017-08-11 PROCEDURE — 74011250636 HC RX REV CODE- 250/636: Performed by: PHYSICAL MEDICINE & REHABILITATION

## 2017-08-11 RX ORDER — MELATONIN
2000 DAILY
Status: DISCONTINUED | OUTPATIENT
Start: 2017-08-11 | End: 2017-08-25 | Stop reason: HOSPADM

## 2017-08-11 RX ADMIN — VITAMIN D, TAB 1000IU (100/BT) 2000 UNITS: 25 TAB at 14:31

## 2017-08-11 RX ADMIN — DOCUSATE SODIUM -SENNOSIDES 1 TABLET: 50; 8.6 TABLET, COATED ORAL at 21:50

## 2017-08-11 RX ADMIN — DOCUSATE SODIUM 100 MG: 100 CAPSULE ORAL at 21:50

## 2017-08-11 RX ADMIN — MELATONIN TAB 3 MG 3 MG: 3 TAB at 21:54

## 2017-08-11 RX ADMIN — ENOXAPARIN SODIUM 40 MG: 40 INJECTION SUBCUTANEOUS at 17:00

## 2017-08-11 RX ADMIN — LISINOPRIL 20 MG: 20 TABLET ORAL at 08:37

## 2017-08-11 RX ADMIN — CLOPIDOGREL 75 MG: 75 TABLET, FILM COATED ORAL at 08:38

## 2017-08-11 RX ADMIN — SERTRALINE 100 MG: 50 TABLET, FILM COATED ORAL at 08:38

## 2017-08-11 RX ADMIN — DOCUSATE SODIUM 100 MG: 100 CAPSULE ORAL at 08:39

## 2017-08-11 RX ADMIN — HYDROCHLOROTHIAZIDE 12.5 MG: 25 TABLET ORAL at 08:38

## 2017-08-11 RX ADMIN — FLUTICASONE PROPIONATE 2 SPRAY: 50 SPRAY, METERED NASAL at 08:39

## 2017-08-11 RX ADMIN — GLIPIZIDE 2.5 MG: 2.5 TABLET, EXTENDED RELEASE ORAL at 08:38

## 2017-08-11 RX ADMIN — VERAPAMIL HYDROCHLORIDE 240 MG: 240 TABLET, FILM COATED, EXTENDED RELEASE ORAL at 09:00

## 2017-08-11 RX ADMIN — ATORVASTATIN CALCIUM 20 MG: 20 TABLET, FILM COATED ORAL at 21:51

## 2017-08-12 PROCEDURE — 74011250636 HC RX REV CODE- 250/636: Performed by: PHYSICAL MEDICINE & REHABILITATION

## 2017-08-12 PROCEDURE — 74011250637 HC RX REV CODE- 250/637: Performed by: PHYSICAL MEDICINE & REHABILITATION

## 2017-08-12 RX ADMIN — DOCUSATE SODIUM -SENNOSIDES 1 TABLET: 50; 8.6 TABLET, COATED ORAL at 21:58

## 2017-08-12 RX ADMIN — LISINOPRIL 20 MG: 20 TABLET ORAL at 08:15

## 2017-08-12 RX ADMIN — DOCUSATE SODIUM 100 MG: 100 CAPSULE ORAL at 08:17

## 2017-08-12 RX ADMIN — POLYETHYLENE GLYCOL (3350) 17 G: 17 POWDER, FOR SOLUTION ORAL at 08:14

## 2017-08-12 RX ADMIN — GLIPIZIDE 2.5 MG: 2.5 TABLET, EXTENDED RELEASE ORAL at 08:17

## 2017-08-12 RX ADMIN — DOCUSATE SODIUM 100 MG: 100 CAPSULE ORAL at 21:58

## 2017-08-12 RX ADMIN — ATORVASTATIN CALCIUM 20 MG: 20 TABLET, FILM COATED ORAL at 21:58

## 2017-08-12 RX ADMIN — FLUTICASONE PROPIONATE 2 SPRAY: 50 SPRAY, METERED NASAL at 08:18

## 2017-08-12 RX ADMIN — ENOXAPARIN SODIUM 40 MG: 40 INJECTION SUBCUTANEOUS at 17:07

## 2017-08-12 RX ADMIN — VITAMIN D, TAB 1000IU (100/BT) 2000 UNITS: 25 TAB at 08:17

## 2017-08-12 RX ADMIN — MELATONIN TAB 3 MG 3 MG: 3 TAB at 21:57

## 2017-08-12 RX ADMIN — CLOPIDOGREL 75 MG: 75 TABLET, FILM COATED ORAL at 08:17

## 2017-08-12 RX ADMIN — VERAPAMIL HYDROCHLORIDE 240 MG: 240 TABLET, FILM COATED, EXTENDED RELEASE ORAL at 08:17

## 2017-08-12 RX ADMIN — HYDROCHLOROTHIAZIDE 12.5 MG: 25 TABLET ORAL at 08:17

## 2017-08-12 RX ADMIN — SERTRALINE 100 MG: 50 TABLET, FILM COATED ORAL at 08:17

## 2017-08-13 PROCEDURE — 74011250637 HC RX REV CODE- 250/637: Performed by: PHYSICAL MEDICINE & REHABILITATION

## 2017-08-13 PROCEDURE — 74011250636 HC RX REV CODE- 250/636: Performed by: PHYSICAL MEDICINE & REHABILITATION

## 2017-08-13 RX ADMIN — GLIPIZIDE 2.5 MG: 2.5 TABLET, EXTENDED RELEASE ORAL at 08:40

## 2017-08-13 RX ADMIN — LISINOPRIL 20 MG: 20 TABLET ORAL at 08:41

## 2017-08-13 RX ADMIN — ATORVASTATIN CALCIUM 20 MG: 20 TABLET, FILM COATED ORAL at 21:34

## 2017-08-13 RX ADMIN — CLOPIDOGREL 75 MG: 75 TABLET, FILM COATED ORAL at 08:41

## 2017-08-13 RX ADMIN — MELATONIN TAB 3 MG 3 MG: 3 TAB at 21:34

## 2017-08-13 RX ADMIN — DOCUSATE SODIUM 100 MG: 100 CAPSULE ORAL at 08:41

## 2017-08-13 RX ADMIN — VERAPAMIL HYDROCHLORIDE 240 MG: 240 TABLET, FILM COATED, EXTENDED RELEASE ORAL at 08:41

## 2017-08-13 RX ADMIN — POLYETHYLENE GLYCOL (3350) 17 G: 17 POWDER, FOR SOLUTION ORAL at 08:42

## 2017-08-13 RX ADMIN — HYDROCHLOROTHIAZIDE 12.5 MG: 25 TABLET ORAL at 08:41

## 2017-08-13 RX ADMIN — VITAMIN D, TAB 1000IU (100/BT) 2000 UNITS: 25 TAB at 08:41

## 2017-08-13 RX ADMIN — ENOXAPARIN SODIUM 40 MG: 40 INJECTION SUBCUTANEOUS at 17:45

## 2017-08-13 RX ADMIN — FLUTICASONE PROPIONATE 2 SPRAY: 50 SPRAY, METERED NASAL at 08:42

## 2017-08-13 RX ADMIN — DOCUSATE SODIUM 100 MG: 100 CAPSULE ORAL at 21:34

## 2017-08-13 RX ADMIN — DOCUSATE SODIUM -SENNOSIDES 1 TABLET: 50; 8.6 TABLET, COATED ORAL at 21:34

## 2017-08-13 RX ADMIN — SERTRALINE 100 MG: 50 TABLET, FILM COATED ORAL at 08:41

## 2017-08-14 PROCEDURE — 74011250636 HC RX REV CODE- 250/636: Performed by: PHYSICAL MEDICINE & REHABILITATION

## 2017-08-14 PROCEDURE — 74011250637 HC RX REV CODE- 250/637: Performed by: PHYSICAL MEDICINE & REHABILITATION

## 2017-08-14 RX ADMIN — VITAMIN D, TAB 1000IU (100/BT) 2000 UNITS: 25 TAB at 08:26

## 2017-08-14 RX ADMIN — VERAPAMIL HYDROCHLORIDE 240 MG: 240 TABLET, FILM COATED, EXTENDED RELEASE ORAL at 08:26

## 2017-08-14 RX ADMIN — DOCUSATE SODIUM 100 MG: 100 CAPSULE ORAL at 21:25

## 2017-08-14 RX ADMIN — SERTRALINE 100 MG: 50 TABLET, FILM COATED ORAL at 08:26

## 2017-08-14 RX ADMIN — HYDROCHLOROTHIAZIDE 12.5 MG: 25 TABLET ORAL at 08:26

## 2017-08-14 RX ADMIN — POLYETHYLENE GLYCOL (3350) 17 G: 17 POWDER, FOR SOLUTION ORAL at 08:27

## 2017-08-14 RX ADMIN — CLOPIDOGREL 75 MG: 75 TABLET, FILM COATED ORAL at 08:26

## 2017-08-14 RX ADMIN — DOCUSATE SODIUM -SENNOSIDES 1 TABLET: 50; 8.6 TABLET, COATED ORAL at 21:25

## 2017-08-14 RX ADMIN — GLIPIZIDE 2.5 MG: 2.5 TABLET, EXTENDED RELEASE ORAL at 08:26

## 2017-08-14 RX ADMIN — ATORVASTATIN CALCIUM 20 MG: 20 TABLET, FILM COATED ORAL at 21:25

## 2017-08-14 RX ADMIN — FLUTICASONE PROPIONATE 2 SPRAY: 50 SPRAY, METERED NASAL at 08:25

## 2017-08-14 RX ADMIN — ENOXAPARIN SODIUM 40 MG: 40 INJECTION SUBCUTANEOUS at 17:43

## 2017-08-14 RX ADMIN — LISINOPRIL 20 MG: 20 TABLET ORAL at 08:25

## 2017-08-14 RX ADMIN — DOCUSATE SODIUM 100 MG: 100 CAPSULE ORAL at 08:26

## 2017-08-14 RX ADMIN — MELATONIN TAB 3 MG 3 MG: 3 TAB at 21:25

## 2017-08-15 PROCEDURE — 74011250637 HC RX REV CODE- 250/637: Performed by: PHYSICAL MEDICINE & REHABILITATION

## 2017-08-15 PROCEDURE — 74011250636 HC RX REV CODE- 250/636: Performed by: PHYSICAL MEDICINE & REHABILITATION

## 2017-08-15 RX ADMIN — MELATONIN TAB 3 MG 3 MG: 3 TAB at 20:42

## 2017-08-15 RX ADMIN — VERAPAMIL HYDROCHLORIDE 240 MG: 240 TABLET, FILM COATED, EXTENDED RELEASE ORAL at 08:32

## 2017-08-15 RX ADMIN — FLUTICASONE PROPIONATE 2 SPRAY: 50 SPRAY, METERED NASAL at 08:31

## 2017-08-15 RX ADMIN — GLIPIZIDE 2.5 MG: 2.5 TABLET, EXTENDED RELEASE ORAL at 08:32

## 2017-08-15 RX ADMIN — SERTRALINE 100 MG: 50 TABLET, FILM COATED ORAL at 08:31

## 2017-08-15 RX ADMIN — POLYETHYLENE GLYCOL (3350) 17 G: 17 POWDER, FOR SOLUTION ORAL at 08:31

## 2017-08-15 RX ADMIN — DOCUSATE SODIUM 100 MG: 100 CAPSULE ORAL at 08:31

## 2017-08-15 RX ADMIN — DOCUSATE SODIUM 100 MG: 100 CAPSULE ORAL at 20:42

## 2017-08-15 RX ADMIN — CLOPIDOGREL 75 MG: 75 TABLET, FILM COATED ORAL at 08:31

## 2017-08-15 RX ADMIN — VITAMIN D, TAB 1000IU (100/BT) 2000 UNITS: 25 TAB at 08:31

## 2017-08-15 RX ADMIN — ATORVASTATIN CALCIUM 20 MG: 20 TABLET, FILM COATED ORAL at 20:43

## 2017-08-15 RX ADMIN — LISINOPRIL 20 MG: 20 TABLET ORAL at 08:31

## 2017-08-15 RX ADMIN — HYDROCHLOROTHIAZIDE 12.5 MG: 25 TABLET ORAL at 08:31

## 2017-08-15 RX ADMIN — DOCUSATE SODIUM -SENNOSIDES 1 TABLET: 50; 8.6 TABLET, COATED ORAL at 20:43

## 2017-08-15 RX ADMIN — ENOXAPARIN SODIUM 40 MG: 40 INJECTION SUBCUTANEOUS at 17:09

## 2017-08-16 PROCEDURE — 74011250637 HC RX REV CODE- 250/637: Performed by: PHYSICAL MEDICINE & REHABILITATION

## 2017-08-16 PROCEDURE — 74011250636 HC RX REV CODE- 250/636: Performed by: PHYSICAL MEDICINE & REHABILITATION

## 2017-08-16 RX ADMIN — HYDROCHLOROTHIAZIDE 12.5 MG: 25 TABLET ORAL at 08:25

## 2017-08-16 RX ADMIN — MELATONIN TAB 3 MG 3 MG: 3 TAB at 21:25

## 2017-08-16 RX ADMIN — DOCUSATE SODIUM 100 MG: 100 CAPSULE ORAL at 21:22

## 2017-08-16 RX ADMIN — VITAMIN D, TAB 1000IU (100/BT) 2000 UNITS: 25 TAB at 08:25

## 2017-08-16 RX ADMIN — CLOPIDOGREL 75 MG: 75 TABLET, FILM COATED ORAL at 08:26

## 2017-08-16 RX ADMIN — GLIPIZIDE 2.5 MG: 2.5 TABLET, EXTENDED RELEASE ORAL at 08:25

## 2017-08-16 RX ADMIN — LISINOPRIL 20 MG: 20 TABLET ORAL at 08:26

## 2017-08-16 RX ADMIN — SERTRALINE 100 MG: 50 TABLET, FILM COATED ORAL at 08:25

## 2017-08-16 RX ADMIN — VERAPAMIL HYDROCHLORIDE 240 MG: 240 TABLET, FILM COATED, EXTENDED RELEASE ORAL at 08:25

## 2017-08-16 RX ADMIN — DOCUSATE SODIUM 100 MG: 100 CAPSULE ORAL at 08:25

## 2017-08-16 RX ADMIN — ENOXAPARIN SODIUM 40 MG: 40 INJECTION SUBCUTANEOUS at 17:23

## 2017-08-16 RX ADMIN — DOCUSATE SODIUM -SENNOSIDES 1 TABLET: 50; 8.6 TABLET, COATED ORAL at 21:22

## 2017-08-16 RX ADMIN — POLYETHYLENE GLYCOL (3350) 17 G: 17 POWDER, FOR SOLUTION ORAL at 08:24

## 2017-08-16 RX ADMIN — FLUTICASONE PROPIONATE 2 SPRAY: 50 SPRAY, METERED NASAL at 08:27

## 2017-08-16 RX ADMIN — ATORVASTATIN CALCIUM 20 MG: 20 TABLET, FILM COATED ORAL at 21:22

## 2017-08-17 LAB
ANION GAP SERPL CALC-SCNC: 6 MMOL/L (ref 5–15)
BASOPHILS # BLD: 0 K/UL (ref 0–0.1)
BASOPHILS NFR BLD: 1 % (ref 0–1)
BUN SERPL-MCNC: 18 MG/DL (ref 6–20)
BUN/CREAT SERPL: 16 (ref 12–20)
CALCIUM SERPL-MCNC: 9.2 MG/DL (ref 8.5–10.1)
CHLORIDE SERPL-SCNC: 103 MMOL/L (ref 97–108)
CO2 SERPL-SCNC: 32 MMOL/L (ref 21–32)
CREAT SERPL-MCNC: 1.15 MG/DL (ref 0.7–1.3)
EOSINOPHIL # BLD: 0.1 K/UL (ref 0–0.4)
EOSINOPHIL NFR BLD: 2 % (ref 0–7)
ERYTHROCYTE [DISTWIDTH] IN BLOOD BY AUTOMATED COUNT: 13.2 % (ref 11.5–14.5)
GLUCOSE SERPL-MCNC: 112 MG/DL (ref 65–100)
HCT VFR BLD AUTO: 42.8 % (ref 36.6–50.3)
HGB BLD-MCNC: 14.2 G/DL (ref 12.1–17)
LYMPHOCYTES # BLD: 1.8 K/UL (ref 0.8–3.5)
LYMPHOCYTES NFR BLD: 29 % (ref 12–49)
MAGNESIUM SERPL-MCNC: 2.4 MG/DL (ref 1.6–2.4)
MCH RBC QN AUTO: 30.1 PG (ref 26–34)
MCHC RBC AUTO-ENTMCNC: 33.2 G/DL (ref 30–36.5)
MCV RBC AUTO: 90.7 FL (ref 80–99)
MONOCYTES # BLD: 0.6 K/UL (ref 0–1)
MONOCYTES NFR BLD: 10 % (ref 5–13)
NEUTS SEG # BLD: 3.7 K/UL (ref 1.8–8)
NEUTS SEG NFR BLD: 58 % (ref 32–75)
PLATELET # BLD AUTO: 174 K/UL (ref 150–400)
POTASSIUM SERPL-SCNC: 4.1 MMOL/L (ref 3.5–5.1)
RBC # BLD AUTO: 4.72 M/UL (ref 4.1–5.7)
SODIUM SERPL-SCNC: 141 MMOL/L (ref 136–145)
WBC # BLD AUTO: 6.3 K/UL (ref 4.1–11.1)

## 2017-08-17 PROCEDURE — 36415 COLL VENOUS BLD VENIPUNCTURE: CPT | Performed by: PHYSICAL MEDICINE & REHABILITATION

## 2017-08-17 PROCEDURE — 85025 COMPLETE CBC W/AUTO DIFF WBC: CPT | Performed by: PHYSICAL MEDICINE & REHABILITATION

## 2017-08-17 PROCEDURE — 83735 ASSAY OF MAGNESIUM: CPT | Performed by: PHYSICAL MEDICINE & REHABILITATION

## 2017-08-17 PROCEDURE — 80048 BASIC METABOLIC PNL TOTAL CA: CPT | Performed by: PHYSICAL MEDICINE & REHABILITATION

## 2017-08-17 PROCEDURE — 74011250636 HC RX REV CODE- 250/636: Performed by: PHYSICAL MEDICINE & REHABILITATION

## 2017-08-17 PROCEDURE — 74011250637 HC RX REV CODE- 250/637: Performed by: PHYSICAL MEDICINE & REHABILITATION

## 2017-08-17 RX ORDER — TRIPROLIDINE/PSEUDOEPHEDRINE 2.5MG-60MG
2 TABLET ORAL DAILY
Status: DISCONTINUED | OUTPATIENT
Start: 2017-08-18 | End: 2017-08-25 | Stop reason: HOSPADM

## 2017-08-17 RX ORDER — LORATADINE 10 MG/1
10 TABLET ORAL DAILY
Status: DISCONTINUED | OUTPATIENT
Start: 2017-08-17 | End: 2017-08-23

## 2017-08-17 RX ADMIN — DOCUSATE SODIUM -SENNOSIDES 1 TABLET: 50; 8.6 TABLET, COATED ORAL at 20:44

## 2017-08-17 RX ADMIN — LISINOPRIL 20 MG: 20 TABLET ORAL at 08:41

## 2017-08-17 RX ADMIN — VERAPAMIL HYDROCHLORIDE 240 MG: 240 TABLET, FILM COATED, EXTENDED RELEASE ORAL at 08:39

## 2017-08-17 RX ADMIN — CLOPIDOGREL 75 MG: 75 TABLET, FILM COATED ORAL at 08:39

## 2017-08-17 RX ADMIN — DOCUSATE SODIUM 100 MG: 100 CAPSULE ORAL at 08:40

## 2017-08-17 RX ADMIN — DOCUSATE SODIUM 100 MG: 100 CAPSULE ORAL at 20:41

## 2017-08-17 RX ADMIN — MELATONIN TAB 3 MG 3 MG: 3 TAB at 20:42

## 2017-08-17 RX ADMIN — LORATADINE 10 MG: 10 TABLET ORAL at 14:36

## 2017-08-17 RX ADMIN — SERTRALINE 100 MG: 50 TABLET, FILM COATED ORAL at 08:39

## 2017-08-17 RX ADMIN — POLYETHYLENE GLYCOL (3350) 17 G: 17 POWDER, FOR SOLUTION ORAL at 08:40

## 2017-08-17 RX ADMIN — VITAMIN D, TAB 1000IU (100/BT) 2000 UNITS: 25 TAB at 08:40

## 2017-08-17 RX ADMIN — GLIPIZIDE 2.5 MG: 2.5 TABLET, EXTENDED RELEASE ORAL at 08:40

## 2017-08-17 RX ADMIN — ATORVASTATIN CALCIUM 20 MG: 20 TABLET, FILM COATED ORAL at 20:43

## 2017-08-17 RX ADMIN — ENOXAPARIN SODIUM 40 MG: 40 INJECTION SUBCUTANEOUS at 17:07

## 2017-08-17 RX ADMIN — HYDROCHLOROTHIAZIDE 12.5 MG: 25 TABLET ORAL at 08:40

## 2017-08-17 RX ADMIN — FLUTICASONE PROPIONATE 2 SPRAY: 50 SPRAY, METERED NASAL at 08:42

## 2017-08-18 PROCEDURE — 74011250637 HC RX REV CODE- 250/637: Performed by: PHYSICAL MEDICINE & REHABILITATION

## 2017-08-18 PROCEDURE — 74011250636 HC RX REV CODE- 250/636: Performed by: PHYSICAL MEDICINE & REHABILITATION

## 2017-08-18 RX ADMIN — ATORVASTATIN CALCIUM 20 MG: 20 TABLET, FILM COATED ORAL at 21:18

## 2017-08-18 RX ADMIN — FLUTICASONE PROPIONATE 2 SPRAY: 50 SPRAY, METERED NASAL at 08:20

## 2017-08-18 RX ADMIN — LORATADINE 10 MG: 10 TABLET ORAL at 08:18

## 2017-08-18 RX ADMIN — CLOPIDOGREL 75 MG: 75 TABLET, FILM COATED ORAL at 08:19

## 2017-08-18 RX ADMIN — VERAPAMIL HYDROCHLORIDE 240 MG: 240 TABLET, FILM COATED, EXTENDED RELEASE ORAL at 08:18

## 2017-08-18 RX ADMIN — MELATONIN TAB 3 MG 3 MG: 3 TAB at 21:18

## 2017-08-18 RX ADMIN — LISINOPRIL 20 MG: 20 TABLET ORAL at 08:18

## 2017-08-18 RX ADMIN — DOCUSATE SODIUM 100 MG: 100 CAPSULE ORAL at 08:19

## 2017-08-18 RX ADMIN — HYDROCHLOROTHIAZIDE 12.5 MG: 25 TABLET ORAL at 08:18

## 2017-08-18 RX ADMIN — DOCUSATE SODIUM 100 MG: 100 CAPSULE ORAL at 21:18

## 2017-08-18 RX ADMIN — ENOXAPARIN SODIUM 40 MG: 40 INJECTION SUBCUTANEOUS at 17:56

## 2017-08-18 RX ADMIN — Medication 2 TABLET: at 08:20

## 2017-08-18 RX ADMIN — GLIPIZIDE 2.5 MG: 2.5 TABLET, EXTENDED RELEASE ORAL at 08:18

## 2017-08-18 RX ADMIN — POLYETHYLENE GLYCOL (3350) 17 G: 17 POWDER, FOR SOLUTION ORAL at 08:18

## 2017-08-18 RX ADMIN — VITAMIN D, TAB 1000IU (100/BT) 2000 UNITS: 25 TAB at 08:19

## 2017-08-18 RX ADMIN — DOCUSATE SODIUM -SENNOSIDES 1 TABLET: 50; 8.6 TABLET, COATED ORAL at 21:18

## 2017-08-18 RX ADMIN — SERTRALINE 100 MG: 50 TABLET, FILM COATED ORAL at 08:19

## 2017-08-19 LAB
ANION GAP SERPL CALC-SCNC: 7 MMOL/L (ref 5–15)
BASOPHILS # BLD: 0.1 K/UL (ref 0–0.1)
BASOPHILS NFR BLD: 1 % (ref 0–1)
BUN SERPL-MCNC: 22 MG/DL (ref 6–20)
BUN/CREAT SERPL: 19 (ref 12–20)
CALCIUM SERPL-MCNC: 9.1 MG/DL (ref 8.5–10.1)
CHLORIDE SERPL-SCNC: 102 MMOL/L (ref 97–108)
CO2 SERPL-SCNC: 29 MMOL/L (ref 21–32)
CREAT SERPL-MCNC: 1.13 MG/DL (ref 0.7–1.3)
EOSINOPHIL # BLD: 0.2 K/UL (ref 0–0.4)
EOSINOPHIL NFR BLD: 3 % (ref 0–7)
ERYTHROCYTE [DISTWIDTH] IN BLOOD BY AUTOMATED COUNT: 13.3 % (ref 11.5–14.5)
GLUCOSE SERPL-MCNC: 107 MG/DL (ref 65–100)
HCT VFR BLD AUTO: 43.4 % (ref 36.6–50.3)
HGB BLD-MCNC: 14.3 G/DL (ref 12.1–17)
LYMPHOCYTES # BLD: 1.7 K/UL (ref 0.8–3.5)
LYMPHOCYTES NFR BLD: 25 % (ref 12–49)
MCH RBC QN AUTO: 30.3 PG (ref 26–34)
MCHC RBC AUTO-ENTMCNC: 32.9 G/DL (ref 30–36.5)
MCV RBC AUTO: 91.9 FL (ref 80–99)
MONOCYTES # BLD: 0.7 K/UL (ref 0–1)
MONOCYTES NFR BLD: 11 % (ref 5–13)
NEUTS SEG # BLD: 4.1 K/UL (ref 1.8–8)
NEUTS SEG NFR BLD: 60 % (ref 32–75)
PLATELET # BLD AUTO: 160 K/UL (ref 150–400)
POTASSIUM SERPL-SCNC: 3.6 MMOL/L (ref 3.5–5.1)
RBC # BLD AUTO: 4.72 M/UL (ref 4.1–5.7)
SODIUM SERPL-SCNC: 138 MMOL/L (ref 136–145)
WBC # BLD AUTO: 6.9 K/UL (ref 4.1–11.1)

## 2017-08-19 PROCEDURE — 74011250637 HC RX REV CODE- 250/637: Performed by: PHYSICAL MEDICINE & REHABILITATION

## 2017-08-19 PROCEDURE — 36415 COLL VENOUS BLD VENIPUNCTURE: CPT | Performed by: PHYSICAL MEDICINE & REHABILITATION

## 2017-08-19 PROCEDURE — 85025 COMPLETE CBC W/AUTO DIFF WBC: CPT | Performed by: PHYSICAL MEDICINE & REHABILITATION

## 2017-08-19 PROCEDURE — 74011250636 HC RX REV CODE- 250/636: Performed by: PHYSICAL MEDICINE & REHABILITATION

## 2017-08-19 PROCEDURE — 80048 BASIC METABOLIC PNL TOTAL CA: CPT | Performed by: PHYSICAL MEDICINE & REHABILITATION

## 2017-08-19 RX ADMIN — Medication 2 TABLET: at 10:56

## 2017-08-19 RX ADMIN — VERAPAMIL HYDROCHLORIDE 240 MG: 240 TABLET, FILM COATED, EXTENDED RELEASE ORAL at 10:13

## 2017-08-19 RX ADMIN — DOCUSATE SODIUM -SENNOSIDES 1 TABLET: 50; 8.6 TABLET, COATED ORAL at 21:48

## 2017-08-19 RX ADMIN — LORATADINE 10 MG: 10 TABLET ORAL at 10:13

## 2017-08-19 RX ADMIN — ENOXAPARIN SODIUM 40 MG: 40 INJECTION SUBCUTANEOUS at 17:50

## 2017-08-19 RX ADMIN — MELATONIN TAB 3 MG 3 MG: 3 TAB at 21:54

## 2017-08-19 RX ADMIN — FLUTICASONE PROPIONATE 2 SPRAY: 50 SPRAY, METERED NASAL at 10:56

## 2017-08-19 RX ADMIN — DOCUSATE SODIUM 100 MG: 100 CAPSULE ORAL at 10:12

## 2017-08-19 RX ADMIN — CLOPIDOGREL 75 MG: 75 TABLET, FILM COATED ORAL at 10:12

## 2017-08-19 RX ADMIN — HYDROCHLOROTHIAZIDE 12.5 MG: 25 TABLET ORAL at 10:13

## 2017-08-19 RX ADMIN — POLYETHYLENE GLYCOL (3350) 17 G: 17 POWDER, FOR SOLUTION ORAL at 10:13

## 2017-08-19 RX ADMIN — SERTRALINE 100 MG: 50 TABLET, FILM COATED ORAL at 10:12

## 2017-08-19 RX ADMIN — VITAMIN D, TAB 1000IU (100/BT) 2000 UNITS: 25 TAB at 10:12

## 2017-08-19 RX ADMIN — ATORVASTATIN CALCIUM 20 MG: 20 TABLET, FILM COATED ORAL at 21:48

## 2017-08-19 RX ADMIN — LISINOPRIL 20 MG: 20 TABLET ORAL at 10:12

## 2017-08-19 RX ADMIN — DOCUSATE SODIUM 100 MG: 100 CAPSULE ORAL at 21:48

## 2017-08-19 RX ADMIN — GLIPIZIDE 2.5 MG: 2.5 TABLET, EXTENDED RELEASE ORAL at 10:13

## 2017-08-20 PROCEDURE — 74011250637 HC RX REV CODE- 250/637: Performed by: PHYSICAL MEDICINE & REHABILITATION

## 2017-08-20 PROCEDURE — 74011250636 HC RX REV CODE- 250/636: Performed by: PHYSICAL MEDICINE & REHABILITATION

## 2017-08-20 RX ADMIN — ENOXAPARIN SODIUM 40 MG: 40 INJECTION SUBCUTANEOUS at 18:07

## 2017-08-20 RX ADMIN — DOCUSATE SODIUM 100 MG: 100 CAPSULE ORAL at 09:41

## 2017-08-20 RX ADMIN — SERTRALINE 100 MG: 50 TABLET, FILM COATED ORAL at 09:41

## 2017-08-20 RX ADMIN — MELATONIN TAB 3 MG 3 MG: 3 TAB at 21:47

## 2017-08-20 RX ADMIN — GLIPIZIDE 2.5 MG: 2.5 TABLET, EXTENDED RELEASE ORAL at 09:42

## 2017-08-20 RX ADMIN — CLOPIDOGREL 75 MG: 75 TABLET, FILM COATED ORAL at 09:41

## 2017-08-20 RX ADMIN — Medication 2 TABLET: at 09:52

## 2017-08-20 RX ADMIN — DOCUSATE SODIUM 100 MG: 100 CAPSULE ORAL at 21:44

## 2017-08-20 RX ADMIN — VERAPAMIL HYDROCHLORIDE 240 MG: 240 TABLET, FILM COATED, EXTENDED RELEASE ORAL at 09:43

## 2017-08-20 RX ADMIN — VITAMIN D, TAB 1000IU (100/BT) 2000 UNITS: 25 TAB at 09:41

## 2017-08-20 RX ADMIN — ATORVASTATIN CALCIUM 20 MG: 20 TABLET, FILM COATED ORAL at 21:43

## 2017-08-20 RX ADMIN — LORATADINE 10 MG: 10 TABLET ORAL at 09:42

## 2017-08-20 RX ADMIN — DOCUSATE SODIUM -SENNOSIDES 1 TABLET: 50; 8.6 TABLET, COATED ORAL at 21:44

## 2017-08-20 RX ADMIN — HYDROCHLOROTHIAZIDE 12.5 MG: 25 TABLET ORAL at 09:42

## 2017-08-20 RX ADMIN — LISINOPRIL 20 MG: 20 TABLET ORAL at 09:42

## 2017-08-20 RX ADMIN — FLUTICASONE PROPIONATE 2 SPRAY: 50 SPRAY, METERED NASAL at 09:52

## 2017-08-21 PROCEDURE — 74011250637 HC RX REV CODE- 250/637: Performed by: PHYSICAL MEDICINE & REHABILITATION

## 2017-08-21 PROCEDURE — 74011250636 HC RX REV CODE- 250/636: Performed by: PHYSICAL MEDICINE & REHABILITATION

## 2017-08-21 RX ORDER — NYSTATIN 100000 [USP'U]/G
POWDER TOPICAL 2 TIMES DAILY
Status: DISCONTINUED | OUTPATIENT
Start: 2017-08-21 | End: 2017-08-25 | Stop reason: HOSPADM

## 2017-08-21 RX ORDER — NYSTATIN 100000 [USP'U]/G
POWDER TOPICAL
Status: DISCONTINUED | OUTPATIENT
Start: 2017-08-21 | End: 2017-08-25 | Stop reason: HOSPADM

## 2017-08-21 RX ADMIN — SERTRALINE 100 MG: 50 TABLET, FILM COATED ORAL at 08:38

## 2017-08-21 RX ADMIN — POLYETHYLENE GLYCOL (3350) 17 G: 17 POWDER, FOR SOLUTION ORAL at 08:41

## 2017-08-21 RX ADMIN — ATORVASTATIN CALCIUM 20 MG: 20 TABLET, FILM COATED ORAL at 21:06

## 2017-08-21 RX ADMIN — DOCUSATE SODIUM 100 MG: 100 CAPSULE ORAL at 21:06

## 2017-08-21 RX ADMIN — LISINOPRIL 20 MG: 20 TABLET ORAL at 08:38

## 2017-08-21 RX ADMIN — Medication 2 TABLET: at 08:46

## 2017-08-21 RX ADMIN — NYSTATIN: 100000 POWDER TOPICAL at 11:36

## 2017-08-21 RX ADMIN — HYDROCHLOROTHIAZIDE 12.5 MG: 25 TABLET ORAL at 08:39

## 2017-08-21 RX ADMIN — MELATONIN TAB 3 MG 3 MG: 3 TAB at 21:10

## 2017-08-21 RX ADMIN — ENOXAPARIN SODIUM 40 MG: 40 INJECTION SUBCUTANEOUS at 17:29

## 2017-08-21 RX ADMIN — FLUTICASONE PROPIONATE 2 SPRAY: 50 SPRAY, METERED NASAL at 08:46

## 2017-08-21 RX ADMIN — LORATADINE 10 MG: 10 TABLET ORAL at 08:38

## 2017-08-21 RX ADMIN — DOCUSATE SODIUM -SENNOSIDES 1 TABLET: 50; 8.6 TABLET, COATED ORAL at 21:06

## 2017-08-21 RX ADMIN — HYDRALAZINE HYDROCHLORIDE 25 MG: 25 TABLET, FILM COATED ORAL at 10:12

## 2017-08-21 RX ADMIN — CLOPIDOGREL 75 MG: 75 TABLET, FILM COATED ORAL at 08:38

## 2017-08-21 RX ADMIN — VERAPAMIL HYDROCHLORIDE 240 MG: 240 TABLET, FILM COATED, EXTENDED RELEASE ORAL at 08:39

## 2017-08-21 RX ADMIN — DOCUSATE SODIUM 100 MG: 100 CAPSULE ORAL at 08:38

## 2017-08-21 RX ADMIN — GLIPIZIDE 2.5 MG: 2.5 TABLET, EXTENDED RELEASE ORAL at 08:38

## 2017-08-21 RX ADMIN — VITAMIN D, TAB 1000IU (100/BT) 2000 UNITS: 25 TAB at 08:38

## 2017-08-22 PROCEDURE — 74011250637 HC RX REV CODE- 250/637: Performed by: PHYSICAL MEDICINE & REHABILITATION

## 2017-08-22 PROCEDURE — 74011250636 HC RX REV CODE- 250/636: Performed by: PHYSICAL MEDICINE & REHABILITATION

## 2017-08-22 RX ADMIN — Medication 2 TABLET: at 08:15

## 2017-08-22 RX ADMIN — VITAMIN D, TAB 1000IU (100/BT) 2000 UNITS: 25 TAB at 08:14

## 2017-08-22 RX ADMIN — DOCUSATE SODIUM -SENNOSIDES 1 TABLET: 50; 8.6 TABLET, COATED ORAL at 21:30

## 2017-08-22 RX ADMIN — NYSTATIN: 100000 POWDER TOPICAL at 21:00

## 2017-08-22 RX ADMIN — NYSTATIN: 100000 POWDER TOPICAL at 08:14

## 2017-08-22 RX ADMIN — SERTRALINE 100 MG: 50 TABLET, FILM COATED ORAL at 08:15

## 2017-08-22 RX ADMIN — MELATONIN TAB 3 MG 3 MG: 3 TAB at 21:30

## 2017-08-22 RX ADMIN — LORATADINE 10 MG: 10 TABLET ORAL at 08:14

## 2017-08-22 RX ADMIN — DOCUSATE SODIUM 100 MG: 100 CAPSULE ORAL at 08:14

## 2017-08-22 RX ADMIN — GLIPIZIDE 2.5 MG: 2.5 TABLET, EXTENDED RELEASE ORAL at 08:14

## 2017-08-22 RX ADMIN — ENOXAPARIN SODIUM 40 MG: 40 INJECTION SUBCUTANEOUS at 17:08

## 2017-08-22 RX ADMIN — FLUTICASONE PROPIONATE 2 SPRAY: 50 SPRAY, METERED NASAL at 08:14

## 2017-08-22 RX ADMIN — DOCUSATE SODIUM 100 MG: 100 CAPSULE ORAL at 21:30

## 2017-08-22 RX ADMIN — LISINOPRIL 20 MG: 20 TABLET ORAL at 08:14

## 2017-08-22 RX ADMIN — VERAPAMIL HYDROCHLORIDE 240 MG: 240 TABLET, FILM COATED, EXTENDED RELEASE ORAL at 08:15

## 2017-08-22 RX ADMIN — POLYETHYLENE GLYCOL (3350) 17 G: 17 POWDER, FOR SOLUTION ORAL at 08:14

## 2017-08-22 RX ADMIN — HYDROCHLOROTHIAZIDE 12.5 MG: 25 TABLET ORAL at 08:15

## 2017-08-22 RX ADMIN — CLOPIDOGREL 75 MG: 75 TABLET, FILM COATED ORAL at 08:15

## 2017-08-22 RX ADMIN — ATORVASTATIN CALCIUM 20 MG: 20 TABLET, FILM COATED ORAL at 21:30

## 2017-08-23 PROCEDURE — 74011250636 HC RX REV CODE- 250/636: Performed by: PHYSICAL MEDICINE & REHABILITATION

## 2017-08-23 PROCEDURE — 74011250637 HC RX REV CODE- 250/637: Performed by: PHYSICAL MEDICINE & REHABILITATION

## 2017-08-23 RX ORDER — CETIRIZINE HCL 10 MG
10 TABLET ORAL
Status: DISCONTINUED | OUTPATIENT
Start: 2017-08-23 | End: 2017-08-25 | Stop reason: HOSPADM

## 2017-08-23 RX ORDER — FLUTICASONE PROPIONATE 50 MCG
2 SPRAY, SUSPENSION (ML) NASAL 2 TIMES DAILY
Status: DISCONTINUED | OUTPATIENT
Start: 2017-08-23 | End: 2017-08-25 | Stop reason: HOSPADM

## 2017-08-23 RX ADMIN — CETIRIZINE HYDROCHLORIDE 10 MG: 10 TABLET, FILM COATED ORAL at 21:05

## 2017-08-23 RX ADMIN — LORATADINE 10 MG: 10 TABLET ORAL at 09:05

## 2017-08-23 RX ADMIN — CLOPIDOGREL 75 MG: 75 TABLET, FILM COATED ORAL at 09:05

## 2017-08-23 RX ADMIN — DOCUSATE SODIUM 100 MG: 100 CAPSULE ORAL at 21:05

## 2017-08-23 RX ADMIN — FLUTICASONE PROPIONATE 2 SPRAY: 50 SPRAY, METERED NASAL at 21:00

## 2017-08-23 RX ADMIN — DOCUSATE SODIUM 100 MG: 100 CAPSULE ORAL at 09:05

## 2017-08-23 RX ADMIN — VITAMIN D, TAB 1000IU (100/BT) 2000 UNITS: 25 TAB at 09:05

## 2017-08-23 RX ADMIN — FLUTICASONE PROPIONATE 2 SPRAY: 50 SPRAY, METERED NASAL at 09:04

## 2017-08-23 RX ADMIN — ENOXAPARIN SODIUM 40 MG: 40 INJECTION SUBCUTANEOUS at 17:16

## 2017-08-23 RX ADMIN — MELATONIN TAB 3 MG 3 MG: 3 TAB at 21:05

## 2017-08-23 RX ADMIN — SERTRALINE 100 MG: 50 TABLET, FILM COATED ORAL at 09:05

## 2017-08-23 RX ADMIN — HYDROCHLOROTHIAZIDE 12.5 MG: 25 TABLET ORAL at 09:04

## 2017-08-23 RX ADMIN — POLYETHYLENE GLYCOL (3350) 17 G: 17 POWDER, FOR SOLUTION ORAL at 09:00

## 2017-08-23 RX ADMIN — LISINOPRIL 20 MG: 20 TABLET ORAL at 09:05

## 2017-08-23 RX ADMIN — DOCUSATE SODIUM -SENNOSIDES 1 TABLET: 50; 8.6 TABLET, COATED ORAL at 21:05

## 2017-08-23 RX ADMIN — GLIPIZIDE 2.5 MG: 2.5 TABLET, EXTENDED RELEASE ORAL at 09:04

## 2017-08-23 RX ADMIN — ATORVASTATIN CALCIUM 20 MG: 20 TABLET, FILM COATED ORAL at 21:05

## 2017-08-23 RX ADMIN — VERAPAMIL HYDROCHLORIDE 240 MG: 240 TABLET, FILM COATED, EXTENDED RELEASE ORAL at 09:05

## 2017-08-23 RX ADMIN — Medication 2 TABLET: at 09:04

## 2017-08-23 RX ADMIN — NYSTATIN: 100000 POWDER TOPICAL at 21:06

## 2017-08-24 PROCEDURE — 74011250636 HC RX REV CODE- 250/636: Performed by: PHYSICAL MEDICINE & REHABILITATION

## 2017-08-24 PROCEDURE — 74011250637 HC RX REV CODE- 250/637: Performed by: PHYSICAL MEDICINE & REHABILITATION

## 2017-08-24 RX ADMIN — GLIPIZIDE 2.5 MG: 2.5 TABLET, EXTENDED RELEASE ORAL at 08:52

## 2017-08-24 RX ADMIN — DOCUSATE SODIUM -SENNOSIDES 1 TABLET: 50; 8.6 TABLET, COATED ORAL at 22:13

## 2017-08-24 RX ADMIN — VERAPAMIL HYDROCHLORIDE 240 MG: 240 TABLET, FILM COATED, EXTENDED RELEASE ORAL at 08:55

## 2017-08-24 RX ADMIN — LISINOPRIL 20 MG: 20 TABLET ORAL at 08:54

## 2017-08-24 RX ADMIN — CETIRIZINE HYDROCHLORIDE 10 MG: 10 TABLET, FILM COATED ORAL at 22:13

## 2017-08-24 RX ADMIN — VITAMIN D, TAB 1000IU (100/BT) 2000 UNITS: 25 TAB at 08:52

## 2017-08-24 RX ADMIN — CLOPIDOGREL 75 MG: 75 TABLET, FILM COATED ORAL at 08:54

## 2017-08-24 RX ADMIN — MELATONIN TAB 3 MG 3 MG: 3 TAB at 22:18

## 2017-08-24 RX ADMIN — SERTRALINE 100 MG: 50 TABLET, FILM COATED ORAL at 08:54

## 2017-08-24 RX ADMIN — ATORVASTATIN CALCIUM 20 MG: 20 TABLET, FILM COATED ORAL at 22:13

## 2017-08-24 RX ADMIN — DOCUSATE SODIUM 100 MG: 100 CAPSULE ORAL at 22:13

## 2017-08-24 RX ADMIN — HYDROCHLOROTHIAZIDE 12.5 MG: 25 TABLET ORAL at 08:53

## 2017-08-24 RX ADMIN — NYSTATIN: 100000 POWDER TOPICAL at 09:00

## 2017-08-24 RX ADMIN — DOCUSATE SODIUM 100 MG: 100 CAPSULE ORAL at 08:55

## 2017-08-24 RX ADMIN — ENOXAPARIN SODIUM 40 MG: 40 INJECTION SUBCUTANEOUS at 18:41

## 2017-08-25 VITALS
HEART RATE: 81 BPM | BODY MASS INDEX: 33.23 KG/M2 | HEIGHT: 73 IN | DIASTOLIC BLOOD PRESSURE: 77 MMHG | SYSTOLIC BLOOD PRESSURE: 127 MMHG | WEIGHT: 250.7 LBS

## 2017-08-25 PROCEDURE — 74011250637 HC RX REV CODE- 250/637: Performed by: PHYSICAL MEDICINE & REHABILITATION

## 2017-08-25 RX ADMIN — LISINOPRIL 20 MG: 20 TABLET ORAL at 09:22

## 2017-08-25 RX ADMIN — VITAMIN D, TAB 1000IU (100/BT) 2000 UNITS: 25 TAB at 09:23

## 2017-08-25 RX ADMIN — VERAPAMIL HYDROCHLORIDE 240 MG: 240 TABLET, FILM COATED, EXTENDED RELEASE ORAL at 09:24

## 2017-08-25 RX ADMIN — SERTRALINE 100 MG: 50 TABLET, FILM COATED ORAL at 09:24

## 2017-08-25 RX ADMIN — CLOPIDOGREL 75 MG: 75 TABLET, FILM COATED ORAL at 09:23

## 2017-08-25 RX ADMIN — HYDROCHLOROTHIAZIDE 12.5 MG: 25 TABLET ORAL at 09:23

## 2017-08-25 RX ADMIN — GLIPIZIDE 2.5 MG: 2.5 TABLET, EXTENDED RELEASE ORAL at 09:23

## 2017-09-06 ENCOUNTER — OFFICE VISIT (OUTPATIENT)
Dept: NEUROLOGY | Age: 72
End: 2017-09-06

## 2017-09-06 VITALS
SYSTOLIC BLOOD PRESSURE: 136 MMHG | DIASTOLIC BLOOD PRESSURE: 74 MMHG | BODY MASS INDEX: 33 KG/M2 | WEIGHT: 249 LBS | HEIGHT: 73 IN

## 2017-09-06 DIAGNOSIS — I63.9 ACUTE CVA (CEREBROVASCULAR ACCIDENT) (HCC): Primary | ICD-10-CM

## 2017-09-06 RX ORDER — PETROLATUM,WHITE/LANOLIN
OINTMENT (GRAM) TOPICAL 3 TIMES DAILY
COMMUNITY

## 2017-09-06 NOTE — PROGRESS NOTES
Pt is here for hospital follow up for stroke. Pt is accompanied by his wife, Ruthann Scott. Pt was seen in the hospital 8/3/17. Pt has ride sided weakness from the CVA.

## 2017-09-06 NOTE — PATIENT INSTRUCTIONS
[address] [facility]   Statement to Patients  April 1, 2014      In an effort to ensure the large volume of patient prescription refills is processed in the most efficient and expeditious manner, we are asking our patients to assist us by calling your Pharmacy for all prescription refills, this will include also your  Mail Order Pharmacy. The pharmacy will contact our office electronically to continue the refill process. Please do not wait until the last minute to call your pharmacy. We need at least 48 hours (2days) to fill prescriptions. We also encourage you to call your pharmacy before going to  your prescription to make sure it is ready. With regard to controlled substance prescription refill requests (narcotic refills) that need to be picked up at our office, we ask your cooperation by providing us with at least 72 hours (3days) notice that you will need a refill. We will not refill narcotic prescription refill requests after 4:00pm on any weekday, Monday through Thursday, or after 2:00pm on Fridays, or on the weekends. We encourage everyone to explore another way of getting your prescription refill request processed using iDentiMob, our patient web portal through our electronic medical record system. iDentiMob is an efficient and effective way to communicate your medication request directly to the office and  downloadable as an susi on your smart phone . iDentiMob also features a review functionality that allows you to view your medication list as well as leave messages for your physician. Are you ready to get connected? If so please review the attatched instructions or speak to any of our staff to get you set up right away! Thank you so much for your cooperation. Should you have any questions please contact our Practice Administrator. The Physicians and Staff,  [facility] [address] [address]  What is a living will?   A living will is a legal form you use to write down the kind of care you want at the end of your life. It is used by the health professionals who will treat you if you aren't able to decide for yourself. If you put your wishes in writing, your loved ones and others will know what kind of care you want. They won't need to guess. This can ease your mind and be helpful to others. A living will is not the same as an estate or property will. An estate will explains what you want to happen with your money and property after you die. Is a living will a legal document? A living will is a legal document. Each state has its own laws about living squires. If you move to another state, make sure that your living will is legal in the state where you now live. Or you might use a universal form that has been approved by many states. This kind of form can sometimes be completed and stored online. Your electronic copy will then be available wherever you have a connection to the Internet. In most cases, doctors will respect your wishes even if you have a form from a different state. · You don't need an  to complete a living will. But legal advice can be helpful if your state's laws are unclear, your health history is complicated, or your family can't agree on what should be in your living will. · You can change your living will at any time. Some people find that their wishes about end-of-life care change as their health changes. · In addition to making a living will, think about completing a medical power of  form. This form lets you name the person you want to make end-of-life treatment decisions for you (your \"health care agent\") if you're not able to. Many hospitals and nursing homes will give you the forms you need to complete a living will and a medical power of . · Your living will is used only if you can't make or communicate decisions for yourself anymore.  If you become able to make decisions again, you can accept or refuse any treatment, no matter what you wrote in your living will. · Your state may offer an online registry. This is a place where you can store your living will online so the doctors and nurses who need to treat you can find it right away. What should you think about when creating a living will? Talk about your end-of-life wishes with your family members and your doctor. Let them know what you want. That way the people making decisions for you won't be surprised by your choices. Think about these questions as you make your living will:  · Do you know enough about life support methods that might be used? If not, talk to your doctor so you know what might be done if you can't breathe on your own, your heart stops, or you're unable to swallow. · What things would you still want to be able to do after you receive life-support methods? Would you want to be able to walk? To speak? To eat on your own? To live without the help of machines? · If you have a choice, where do you want to be cared for? In your home? At a hospital or nursing home? · Do you want certain Spiritism practices performed if you become very ill? · If you have a choice at the end of your life, where would you prefer to die? At home? In a hospital or nursing home? Somewhere else? · Would you prefer to be buried or cremated? · Do you want your organs to be donated after you die? What should you do with your living will? · Make sure that your family members and your health care agent have copies of your living will. · Give your doctor a copy of your living will to keep in your medical record. If you have more than one doctor, make sure that each one has a copy. · You may want to put a copy of your living will where it can be easily found. Where can you learn more? Go to http://harish-kathleen.info/. Enter S481 in the search box to learn more about \"Learning About Living Joshua Orozco. \"  Current as of: August 8, 2016  Content Version: 11.3  © 7266-3241 Kozio. Care instructions adapted under license by Bettery (which disclaims liability or warranty for this information). If you have questions about a medical condition or this instruction, always ask your healthcare professional. [facility] any warranty or liability for your use of this information. Advance Directives: Care Instructions  Your Care Instructions  An advance directive is a legal way to state your wishes at the end of your life. It tells your family and your doctor what to do if you can no longer say what you want. There are two main types of advance directives. You can change them any time that your wishes change. · A living will tells your family and your doctor your wishes about life support and other treatment. · A durable power of  for health care lets you name a person to make treatment decisions for you when you can't speak for yourself. This person is called a health care agent. If you do not have an advance directive, decisions about your medical care may be made by a doctor or a  who doesn't know you. It may help to think of an advance directive as a gift to the people who care for you. If you have one, they won't have to make tough decisions by themselves. Follow-up care is a key part of your treatment and safety. Be sure to make and go to all appointments, and call your doctor if you are having problems. It's also a good idea to know your test results and keep a list of the medicines you take. How can you care for yourself at home? · Discuss your wishes with your loved ones and your doctor. This way, there are no surprises. · Many states have a unique form. Or you might use a universal form that has been approved by many states. This kind of form can sometimes be completed and stored online.  Your electronic copy will then be available wherever you have a connection to the Internet. In most cases, doctors will respect your wishes even if you have a form from a different state. · You don't need a  to do an advance directive. But you may want to get legal advice. · Think about these questions when you prepare an advance directive:  ¨ Who do you want to make decisions about your medical care if you are not able to? Many people choose a family member or close friend. ¨ Do you know enough about life support methods that might be used? If not, talk to your doctor so you understand. ¨ What are you most afraid of that might happen? You might be afraid of having pain, losing your independence, or being kept alive by machines. ¨ Where would you prefer to die? Choices include your home, a hospital, or a nursing home. ¨ Would you like to have information about hospice care to support you and your family? ¨ Do you want to donate organs when you die? ¨ Do you want certain Lutheran practices performed before you die? If so, put your wishes in the advance directive. · Read your advance directive every year, and make changes as needed. When should you call for help? Be sure to contact your doctor if you have any questions. Where can you learn more? Go to http://harish-kathleen.info/. Enter R264 in the search box to learn more about \"Advance Directives: Care Instructions. \"  Current as of: November 17, 2016  Content Version: 11.3  © 8394-7123 GlucoVista. Care instructions adapted under license by [employer] (which disclaims liability or warranty for this information). If you have questions about a medical condition or this instruction, always ask your healthcare professional. [address] any warranty or liability for your use of this information.

## 2017-09-06 NOTE — MR AVS SNAPSHOT
Visit Information Date & Time Provider Department Dept. Phone Encounter #  
 9/6/2017 10:00 AM HELENA Esparza Neurology [facility] [phone] [id number] Follow-up Instructions Return if symptoms worsen or fail to improve. Upcoming Health Maintenance Date Due Hepatitis C Screening 1945 FOOT EXAM Q1 7/16/1957 MICROALBUMIN Q1 7/16/1957 EYE EXAM RETINAL OR DILATED Q1 7/16/1957 DTaP/Tdap/Td series (1 - Tdap) 7/16/1968 FOBT Q 1 YEAR AGE 50-75 7/16/1997 ZOSTER VACCINE AGE 60> 5/16/2007 GLAUCOMA SCREENING Q2Y 7/16/2012 Pneumococcal 65+ Low/Medium Risk (1 of 2 - PCV13) 7/16/2012 MEDICARE YEARLY EXAM 7/16/2012 INFLUENZA AGE 9 TO ADULT 8/1/2017 HEMOGLOBIN A1C Q6M 2/4/2018 LIPID PANEL Q1 8/4/2018 Allergies as of 9/6/2017  Review Complete On: 9/6/2017 By: Padmaja Nuno Severity Noted Reaction Type Reactions Codeine  06/21/2012   Side Effect Nausea Only Current Immunizations  Never Reviewed No immunizations on file. Not reviewed this visit Vitals BP Height(growth percentile) Weight(growth percentile) BMI Smoking Status 136/74 6' 1\" (1.854 m) 249 lb (112.9 kg) 32.85 kg/m2 Former Smoker Vitals History BMI and BSA Data Body Mass Index Body Surface Area  
 32.85 kg/m 2 2.41 m 2 Preferred Pharmacy Pharmacy Name Phone [facility] [address] [place] [address] [phone] Your Updated Medication List  
  
   
This list is accurate as of: 9/6/17 10:33 AM.  Always use your most recent med list.  
  
  
  
  
 atorvastatin 20 mg tablet Commonly known as:  LIPITOR Take 1 Tab by mouth nightly. clopidogrel 75 mg Tab Commonly known as:  PLAVIX Take 1 Tab by mouth daily. FLONASE 50 mcg/actuation nasal spray Generic drug:  fluticasone 2 Sprays by Both Nostrils route daily. glucosamine sulfate 500 mg capsule Take  by mouth three (3) times daily. hydroCHLOROthiazide 12.5 mg tablet Commonly known as:  HYDRODIURIL Take 1 Tab by mouth daily. lisinopril 20 mg tablet Commonly known as:  Dorathy Massed Take 1 Tab by mouth daily. melatonin 3 mg tablet Take 1 Tab by mouth nightly as needed. sertraline 100 mg tablet Commonly known as:  ZOLOFT Take 100 mg by mouth daily. * verapamil  mg CR tablet Commonly known as:  CALAN-SR Take 1 Tab by mouth daily. * verapamil  mg CR capsule Commonly known as:  Carmell Donavan Take 180 mg by mouth daily. VITAMIN D3 2,000 unit Tab Generic drug:  cholecalciferol (vitamin D3) Take 1 Tab by mouth daily. * Notice: This list has 2 medication(s) that are the same as other medications prescribed for you. Read the directions carefully, and ask your doctor or other care provider to review them with you. Follow-up Instructions Return if symptoms worsen or fail to improve. Patient Instructions PRESCRIPTION REFILL POLICY [facility] Statement to Patients April 1, 2014 In an effort to ensure the large volume of patient prescription refills is processed in the most efficient and expeditious manner, we are asking our patients to assist us by calling your Pharmacy for all prescription refills, this will include also your  Mail Order Pharmacy. The pharmacy will contact our office electronically to continue the refill process. Please do not wait until the last minute to call your pharmacy. We need at least 48 hours (2days) to fill prescriptions. We also encourage you to call your pharmacy before going to  your prescription to make sure it is ready.   
 
With regard to controlled substance prescription refill requests (narcotic refills) that need to be picked up at our office, we ask your cooperation by providing us with at least 72 hours (3days) notice that you will need a refill. We will not refill narcotic prescription refill requests after 4:00pm on any weekday, Monday through Thursday, or after 2:00pm on Fridays, or on the weekends. We encourage everyone to explore another way of getting your prescription refill request processed using PsomasFMG, our patient web portal through our electronic medical record system. PsomasFMG is an efficient and effective way to communicate your medication request directly to the office and  downloadable as an susi on your smart phone . Psomas[employer]G also features a review functionality that allows you to view your medication list as well as leave messages for your physician. Are you ready to get connected? If so please review the attatched instructions or speak to any of our staff to get you set up right away! Thank you so much for your cooperation. Should you have any questions please contact our Practice Administrator. The Physicians and Staff,  [facility] Gillian Keys [address] What is a living will? A living will is a legal form you use to write down the kind of care you want at the end of your life. It is used by the health professionals who will treat you if you aren't able to decide for yourself. If you put your wishes in writing, your loved ones and others will know what kind of care you want. They won't need to guess. This can ease your mind and be helpful to others. A living will is not the same as an estate or property will. An estate will explains what you want to happen with your money and property after you die. Is a living will a legal document? A living will is a legal document. Each state has its own laws about living squires. If you move to another state, make sure that your living will is legal in the state where you now live. Or you might use a universal form that has been approved by many states.  This kind of form can sometimes be completed and stored online. Your electronic copy will then be available wherever you have a connection to the Internet. In most cases, doctors will respect your wishes even if you have a form from a different state. · You don't need an  to complete a living will. But legal advice can be helpful if your state's laws are unclear, your health history is complicated, or your family can't agree on what should be in your living will. · You can change your living will at any time. Some people find that their wishes about end-of-life care change as their health changes. · In addition to making a living will, think about completing a medical power of  form. This form lets you name the person you want to make end-of-life treatment decisions for you (your \"health care agent\") if you're not able to. Many hospitals and nursing homes will give you the forms you need to complete a living will and a medical power of . · Your living will is used only if you can't make or communicate decisions for yourself anymore. If you become able to make decisions again, you can accept or refuse any treatment, no matter what you wrote in your living will. · Your state may offer an online registry. This is a place where you can store your living will online so the doctors and nurses who need to treat you can find it right away. What should you think about when creating a living will? Talk about your end-of-life wishes with your family members and your doctor. Let them know what you want. That way the people making decisions for you won't be surprised by your choices. Think about these questions as you make your living will: · Do you know enough about life support methods that might be used? If not, talk to your doctor so you know what might be done if you can't breathe on your own, your heart stops, or you're unable to swallow.  
· What things would you still want to be able to do after you receive life-support methods? Would you want to be able to walk? To speak? To eat on your own? To live without the help of machines? · If you have a choice, where do you want to be cared for? In your home? At a hospital or nursing home? · Do you want certain Zoroastrianism practices performed if you become very ill? · If you have a choice at the end of your life, where would you prefer to die? At home? In a hospital or nursing home? Somewhere else? · Would you prefer to be buried or cremated? · Do you want your organs to be donated after you die? What should you do with your living will? · Make sure that your family members and your health care agent have copies of your living will. · Give your doctor a copy of your living will to keep in your medical record. If you have more than one doctor, make sure that each one has a copy. · You may want to put a copy of your living will where it can be easily found. Where can you learn more? Go to http://harish-kathleen.info/. Enter V982 in the search box to learn more about \"Learning About Living Carmelo. \" Current as of: August 8, 2016 Content Version: 11.3 © 7586-3933 Vocent. Care instructions adapted under license by Zygo Communications (which disclaims liability or warranty for this information). If you have questions about a medical condition or this instruction, always ask your healthcare professional. [place] [address] any warranty or liability for your use of this information. Advance Directives: Care Instructions Your Care Instructions An advance directive is a legal way to state your wishes at the end of your life. It tells your family and your doctor what to do if you can no longer say what you want. There are two main types of advance directives. You can change them any time that your wishes change. · A living will tells your family and your doctor your wishes about life support and other treatment. · A durable power of  for health care lets you name a person to make treatment decisions for you when you can't speak for yourself. This person is called a health care agent. If you do not have an advance directive, decisions about your medical care may be made by a doctor or a  who doesn't know you. It may help to think of an advance directive as a gift to the people who care for you. If you have one, they won't have to make tough decisions by themselves. Follow-up care is a key part of your treatment and safety. Be sure to make and go to all appointments, and call your doctor if you are having problems. It's also a good idea to know your test results and keep a list of the medicines you take. How can you care for yourself at home? · Discuss your wishes with your loved ones and your doctor. This way, there are no surprises. · Many states have a unique form. Or you might use a universal form that has been approved by many states. This kind of form can sometimes be completed and stored online. Your electronic copy will then be available wherever you have a connection to the Internet. In most cases, doctors will respect your wishes even if you have a form from a different state. · You don't need a  to do an advance directive. But you may want to get legal advice. · Think about these questions when you prepare an advance directive: ¨ Who do you want to make decisions about your medical care if you are not able to? Many people choose a family member or close friend. ¨ Do you know enough about life support methods that might be used? If not, talk to your doctor so you understand. ¨ What are you most afraid of that might happen? You might be afraid of having pain, losing your independence, or being kept alive by machines. ¨ Where would you prefer to die? Choices include your home, a hospital, or a nursing home.  
¨ Would you like to have information about hospice care to support you and your family? ¨ Do you want to donate organs when you die? ¨ Do you want certain Yazidi practices performed before you die? If so, put your wishes in the advance directive. · Read your advance directive every year, and make changes as needed. When should you call for help? Be sure to contact your doctor if you have any questions. Where can you learn more? Go to http://harish-kathleen.info/. Enter R264 in the search box to learn more about \"Advance Directives: Care Instructions. \" Current as of: November 17, 2016 Content Version: 11.3 © 2784-3530 PingMD. Care instructions adapted under license by [employer] (which disclaims liability or warranty for this information). If you have questions about a medical condition or this instruction, always ask your healthcare professional. [address] any warranty or liability for your use of this information. Introducing [facility]! Tim Latham introduces [employer] patient portal. Now you can access parts of your medical record, email your doctor's office, and request medication refills online. 1. In your internet browser, go to [email or web address] 2. Click on the First Time User? Click Here link in the Sign In box. You will see the New Member Sign Up page. 3. Enter your [employer] Access Code exactly as it appears below. You will not need to use this code after youve completed the sign-up process. If you do not sign up before the expiration date, you must request a new code. · [employer] Access Code: [email or web address] Expires: 11/2/2017  8:50 AM 
 
4. Enter the last four digits of your Social Security Number (xxxx) and Date of Birth (mm/dd/yyyy) as indicated and click Submit. You will be taken to the next sign-up page. 5. Create a [employer] ID. This will be your [employer] login ID and cannot be changed, so think of one that is secure and easy to remember. 6. Create a GoFormz password. You can change your password at any time. 7. Enter your Password Reset Question and Answer. This can be used at a later time if you forget your password. 8. Enter your e-mail address. You will receive e-mail notification when new information is available in [address] 9. Click Sign Up. You can now view and download portions of your medical record. 10. Click the Download Summary menu link to download a portable copy of your medical information. If you have questions, please visit the Frequently Asked Questions section of the GoFormz website. Remember, GoFormz is NOT to be used for urgent needs. For medical emergencies, dial 911. Now available from your iPhone and Android! Please provide this summary of care documentation to your next provider. Your primary care clinician is listed as Abiola Watts. If you have any questions after today's visit, please call [phone].

## 2017-09-06 NOTE — PROGRESS NOTES
Thierry Rosales is a 81 y.o. male who presents with the following  Chief Complaint   Patient presents with   [facility] Follow Up       HPI Patient comes in with wife for a follow up for stroke. Had acute CVA and has still some weakness and strength trouble on the RUE and RLE. Has been getting PT and feeling this has really been helping. He has noticed increase in strength and endurance since last discharge. He is doing exercises at home also. Feeling improvement. No trouble with speech or swallowing. No memory trouble or mood changes. He has been using a cane to walk for stability. On Plavix and Lipitor now. Trying to be more active and eat better. Overall is feeling like things are going well. Allergies   Allergen Reactions    Codeine Nausea Only       Current Outpatient Prescriptions   Medication Sig    glucosamine sulfate 500 mg capsule Take  by mouth three (3) times daily.  atorvastatin (LIPITOR) 20 mg tablet Take 1 Tab by mouth nightly.  clopidogrel (PLAVIX) 75 mg tab Take 1 Tab by mouth daily.  hydroCHLOROthiazide (HYDRODIURIL) 12.5 mg tablet Take 1 Tab by mouth daily.  verapamil ER (CALAN-SR) 240 mg CR tablet Take 1 Tab by mouth daily.  lisinopril (PRINIVIL, ZESTRIL) 20 mg tablet Take 1 Tab by mouth daily.  melatonin 3 mg tablet Take 1 Tab by mouth nightly as needed.  cholecalciferol, vitamin D3, (VITAMIN D3) 2,000 unit tab Take 1 Tab by mouth daily.  fluticasone (FLONASE) 50 mcg/actuation nasal spray 2 Sprays by Both Nostrils route daily.  sertraline (ZOLOFT) 100 mg tablet Take 100 mg by mouth daily.  verapamil CR (VERELAN) 180 mg CR capsule Take 180 mg by mouth daily. No current facility-administered medications for this visit.         History   Smoking Status    Former Smoker   Smokeless Tobacco    Never Used       Past Medical History:   Diagnosis Date    Family history of skin cancer     Hypertension     Skin cancer     Squamous skin cancer on top of head  Stroke [facility]) 2008    TIA    Sun-damaged skin     Sunburn, blistering     Unspecified sleep apnea     cpap       Past Surgical History:   Procedure Laterality Date    HX KNEE ARTHROSCOPY Bilateral 2011    HX ORTHOPAEDIC  2004    TRIGGER FINGER-R HAND    HX ORTHOPAEDIC      finger - left        Family History   Problem Relation Age of Onset    Diabetes Mother     Cancer Mother      LUNG    Heart Disease Father     Cancer Sister      PANCREATIC    Cancer Sister      BRAIN    Malignant Hyperthermia Neg Hx     Pseudocholinesterase Deficiency Neg Hx     Delayed Awakening Neg Hx     Post-op Nausea/Vomiting Neg Hx     Emergence Delirium Neg Hx     Post-op Cognitive Dysfunction Neg Hx     Other Neg Hx        Social History     Social History    Marital status:      Spouse name: N/A    Number of children: N/A    Years of education: N/A     Social History Main Topics    Smoking status: Former Smoker    Smokeless tobacco: Never Used    Alcohol use 1.5 oz/week     3 Glasses of wine per week      Comment: Rare    Drug use: No    Sexual activity: Not Asked     Other Topics Concern    None     Social History Narrative    ** Merged History Encounter **            Review of Systems   HENT: Negative for ear pain, hearing loss and tinnitus. Eyes: Negative for blurred vision, double vision and photophobia. Respiratory: Negative for shortness of breath and wheezing. Cardiovascular: Negative for chest pain and palpitations. Gastrointestinal: Negative for nausea and vomiting. Musculoskeletal: Negative for falls. Neurological: Positive for weakness. Negative for dizziness, tingling and headaches. Psychiatric/Behavioral: Negative for depression and memory loss. The patient is not nervous/anxious and does not have insomnia. Remainder of comprehensive review is negative.      Physical Exam :    Visit Vitals    /74    Ht 6' 1\" (1.854 m)    Wt 112.9 kg (249 lb)    BMI 32.85 kg/m2       General: Well defined, nourished, and groomed individual in no acute distress.    Neck: Supple, nontender, no bruits, no pain with resistance to active range of motion.    Heart: Regular rate and rhythm, no murmurs, rub, or gallop. Normal S1S2. Lungs: Clear to auscultation bilaterally with equal chest expansion, no cough, no wheeze  Musculoskeletal: Extremities revealed no edema and had full range of motion of joints.    Psych: Good mood and bright affect    NEUROLOGICAL EXAMINATION:    Mental Status: Alert and oriented to person, place, and time    Cranial Nerves:    II, III, IV, VI: Visual acuity grossly intact. Visual fields are normal.    Pupils are equal, round, and reactive to light and accommodation.    Extra-ocular movements are full and fluid. Fundoscopic exam was benign, no ptosis or nystagmus.    V-XII: Hearing is grossly intact. Facial features are symmetric, with normal sensation and strength. The palate rises symmetrically and the tongue protrudes midline. Sternocleidomastoids 5/5. Motor Examination: Normal tone, bulk, and strength, 5/5 muscle strength throughout left, 2-3/5 on RUE and RLE , and  on R hand. Coordination: Finger to nose was normal. No resting or intention tremor    Gait and Station: Steady while walking with and without cane for balance. Reflexes: DTRs 2+ throughout.             Results for orders placed or performed during the hospital encounter of 08/09/17   CULTURE, URINE   Result Value Ref Range    Special Requests: NO SPECIAL REQUESTS      Arco Count <1,000 CFU/ML      Culture result: NO GROWTH 2 DAYS     URINALYSIS W/MICROSCOPIC   Result Value Ref Range    Color YELLOW/STRAW      Appearance CLEAR CLEAR      Specific gravity 1.015 1.003 - 1.030      pH (UA) 5.5 5.0 - 8.0      Protein NEGATIVE  NEG mg/dL    Glucose NEGATIVE  NEG mg/dL    Ketone NEGATIVE  NEG mg/dL    Bilirubin NEGATIVE  NEG      Blood NEGATIVE  NEG      Urobilinogen 1.0 0.2 - 1.0 EU/dL Nitrites NEGATIVE  NEG      Leukocyte Esterase NEGATIVE  NEG      WBC 0-4 0 - 4 /hpf    RBC 0-5 0 - 5 /hpf    Epithelial cells FEW FEW /lpf    Bacteria NEGATIVE  NEG /hpf    Hyaline cast 0-2 0 - 5 /lpf   METABOLIC PANEL, COMPREHENSIVE   Result Value Ref Range    Sodium 138 136 - 145 mmol/L    Potassium 3.6 3.5 - 5.1 mmol/L    Chloride 101 97 - 108 mmol/L    CO2 29 21 - 32 mmol/L    Anion gap 8 5 - 15 mmol/L    Glucose 117 (H) 65 - 100 mg/dL    BUN 22 (H) 6 - 20 MG/DL    Creatinine 1.25 0.70 - 1.30 MG/DL    BUN/Creatinine ratio 18 12 - 20      GFR est AA >60 >60 ml/min/1.73m2    GFR est non-AA 57 (L) >60 ml/min/1.73m2    Calcium 8.8 8.5 - 10.1 MG/DL    Bilirubin, total 0.8 0.2 - 1.0 MG/DL    ALT (SGPT) 85 (H) 12 - 78 U/L    AST (SGOT) 34 15 - 37 U/L    Alk. phosphatase 68 45 - 117 U/L    Protein, total 7.6 6.4 - 8.2 g/dL    Albumin 3.9 3.5 - 5.0 g/dL    Globulin 3.7 2.0 - 4.0 g/dL    A-G Ratio 1.1 1.1 - 2.2     MAGNESIUM   Result Value Ref Range    Magnesium 2.6 (H) 1.6 - 2.4 mg/dL   CBC W/O DIFF   Result Value Ref Range    WBC 7.5 4.1 - 11.1 K/uL    RBC 4.96 4.10 - 5.70 M/uL    HGB 15.1 12.1 - 17.0 g/dL    HCT 44.7 36.6 - 50.3 %    MCV 90.1 80.0 - 99.0 FL    MCH 30.4 26.0 - 34.0 PG    MCHC 33.8 30.0 - 36.5 g/dL    RDW 13.5 11.5 - 14.5 %    PLATELET 510 709 - 066 K/uL   VITAMIN D, 25 HYDROXY   Result Value Ref Range    Vitamin D 25-Hydroxy 25.1 (L) 30 - 100 ng/mL   CBC WITH AUTOMATED DIFF   Result Value Ref Range    WBC 6.3 4.1 - 11.1 K/uL    RBC 4.72 4.10 - 5.70 M/uL    HGB 14.2 12.1 - 17.0 g/dL    HCT 42.8 36.6 - 50.3 %    MCV 90.7 80.0 - 99.0 FL    MCH 30.1 26.0 - 34.0 PG    MCHC 33.2 30.0 - 36.5 g/dL    RDW 13.2 11.5 - 14.5 %    PLATELET 704 583 - 629 K/uL    NEUTROPHILS 58 32 - 75 %    LYMPHOCYTES 29 12 - 49 %    MONOCYTES 10 5 - 13 %    EOSINOPHILS 2 0 - 7 %    BASOPHILS 1 0 - 1 %    ABS. NEUTROPHILS 3.7 1.8 - 8.0 K/UL    ABS. LYMPHOCYTES 1.8 0.8 - 3.5 K/UL    ABS. MONOCYTES 0.6 0.0 - 1.0 K/UL    ABS. EOSINOPHILS 0.1 0.0 - 0.4 K/UL    ABS. BASOPHILS 0.0 0.0 - 0.1 K/UL   METABOLIC PANEL, BASIC   Result Value Ref Range    Sodium 141 136 - 145 mmol/L    Potassium 4.1 3.5 - 5.1 mmol/L    Chloride 103 97 - 108 mmol/L    CO2 32 21 - 32 mmol/L    Anion gap 6 5 - 15 mmol/L    Glucose 112 (H) 65 - 100 mg/dL    BUN 18 6 - 20 MG/DL    Creatinine 1.15 0.70 - 1.30 MG/DL    BUN/Creatinine ratio 16 12 - 20      GFR est AA >60 >60 ml/min/1.73m2    GFR est non-AA >60 >60 ml/min/1.73m2    Calcium 9.2 8.5 - 10.1 MG/DL   MAGNESIUM   Result Value Ref Range    Magnesium 2.4 1.6 - 2.4 mg/dL   CBC WITH AUTOMATED DIFF   Result Value Ref Range    WBC 6.9 4.1 - 11.1 K/uL    RBC 4.72 4.10 - 5.70 M/uL    HGB 14.3 12.1 - 17.0 g/dL    HCT 43.4 36.6 - 50.3 %    MCV 91.9 80.0 - 99.0 FL    MCH 30.3 26.0 - 34.0 PG    MCHC 32.9 30.0 - 36.5 g/dL    RDW 13.3 11.5 - 14.5 %    PLATELET 345 173 - 845 K/uL    NEUTROPHILS 60 32 - 75 %    LYMPHOCYTES 25 12 - 49 %    MONOCYTES 11 5 - 13 %    EOSINOPHILS 3 0 - 7 %    BASOPHILS 1 0 - 1 %    ABS. NEUTROPHILS 4.1 1.8 - 8.0 K/UL    ABS. LYMPHOCYTES 1.7 0.8 - 3.5 K/UL    ABS. MONOCYTES 0.7 0.0 - 1.0 K/UL    ABS. EOSINOPHILS 0.2 0.0 - 0.4 K/UL    ABS. BASOPHILS 0.1 0.0 - 0.1 K/UL   METABOLIC PANEL, BASIC   Result Value Ref Range    Sodium 138 136 - 145 mmol/L    Potassium 3.6 3.5 - 5.1 mmol/L    Chloride 102 97 - 108 mmol/L    CO2 29 21 - 32 mmol/L    Anion gap 7 5 - 15 mmol/L    Glucose 107 (H) 65 - 100 mg/dL    BUN 22 (H) 6 - 20 MG/DL    Creatinine 1.13 0.70 - 1.30 MG/DL    BUN/Creatinine ratio 19 12 - 20      GFR est AA >60 >60 ml/min/1.73m2    GFR est non-AA >60 >60 ml/min/1.73m2    Calcium 9.1 8.5 - 10.1 MG/DL       Orders Placed This Encounter    glucosamine sulfate 500 mg capsule     Sig: Take  by mouth three (3) times daily. No diagnosis found. Follow-up Disposition:  Return if symptoms worsen or fail to improve. Post stroke.  Keep plavix and lipitor and goal of LDL below 70 per stroke guidelines. Continue to monitor diet and lifestyle changes as exercising and staying healthy. Wife is helping with this. He understands the s/s of a stroke and when to call 911. Continue with PT as this can help with strength and mobility and balance. Continue to use cane for balance. Not driving until further increase in strength. Call with any issues. Doing better.        This note will not be viewable in [employer]

## 2018-11-20 ENCOUNTER — APPOINTMENT (OUTPATIENT)
Dept: GENERAL RADIOLOGY | Age: 73
End: 2018-11-20
Attending: EMERGENCY MEDICINE
Payer: COMMERCIAL

## 2018-11-20 ENCOUNTER — HOSPITAL ENCOUNTER (EMERGENCY)
Age: 73
Discharge: HOME | End: 2018-11-20
Attending: EMERGENCY MEDICINE
Payer: COMMERCIAL

## 2018-11-20 VITALS
DIASTOLIC BLOOD PRESSURE: 81 MMHG | TEMPERATURE: 97.7 F | RESPIRATION RATE: 16 BRPM | BODY MASS INDEX: 31.81 KG/M2 | HEIGHT: 73 IN | SYSTOLIC BLOOD PRESSURE: 169 MMHG | OXYGEN SATURATION: 96 % | HEART RATE: 73 BPM | WEIGHT: 240 LBS

## 2018-11-20 DIAGNOSIS — S61.011A LACERATION OF RIGHT THUMB WITHOUT FOREIGN BODY WITHOUT DAMAGE TO NAIL, INITIAL ENCOUNTER: Primary | ICD-10-CM

## 2018-11-20 PROCEDURE — 74011250636 HC RX REV CODE- 250/636: Performed by: PHYSICIAN ASSISTANT

## 2018-11-20 PROCEDURE — 73140 X-RAY EXAM OF FINGER(S): CPT

## 2018-11-20 PROCEDURE — 99282 EMERGENCY DEPT VISIT SF MDM: CPT

## 2018-11-20 PROCEDURE — 77030002916 HC SUT ETHLN J&J -A

## 2018-11-20 PROCEDURE — 74011250636 HC RX REV CODE- 250/636: Performed by: EMERGENCY MEDICINE

## 2018-11-20 PROCEDURE — 74011000250 HC RX REV CODE- 250: Performed by: EMERGENCY MEDICINE

## 2018-11-20 PROCEDURE — 75810000293 HC SIMP/SUPERF WND  RPR

## 2018-11-20 RX ORDER — BACITRACIN 500 UNIT/G
1 PACKET (EA) TOPICAL
Status: COMPLETED | OUTPATIENT
Start: 2018-11-20 | End: 2018-11-20

## 2018-11-20 RX ORDER — LIDOCAINE HYDROCHLORIDE 20 MG/ML
0.3 INJECTION, SOLUTION EPIDURAL; INFILTRATION; INTRACAUDAL; PERINEURAL ONCE
Status: COMPLETED | OUTPATIENT
Start: 2018-11-20 | End: 2018-11-20

## 2018-11-20 RX ORDER — CEPHALEXIN 500 MG/1
500 CAPSULE ORAL 3 TIMES DAILY
Qty: 15 CAP | Refills: 0 | Status: SHIPPED | OUTPATIENT
Start: 2018-11-20

## 2018-11-20 RX ORDER — LIDOCAINE HYDROCHLORIDE 10 MG/ML
1 INJECTION, SOLUTION EPIDURAL; INFILTRATION; INTRACAUDAL; PERINEURAL ONCE
Status: COMPLETED | OUTPATIENT
Start: 2018-11-20 | End: 2018-11-20

## 2018-11-20 RX ADMIN — LIDOCAINE HYDROCHLORIDE 1 ML: 10 INJECTION, SOLUTION EPIDURAL; INFILTRATION; INTRACAUDAL; PERINEURAL at 11:20

## 2018-11-20 RX ADMIN — BACITRACIN 1 PACKET: 500 OINTMENT TOPICAL at 11:27

## 2018-11-20 RX ADMIN — LIDOCAINE HYDROCHLORIDE 6 MG: 20 INJECTION, SOLUTION EPIDURAL; INFILTRATION; INTRACAUDAL; PERINEURAL at 10:41

## 2018-11-20 NOTE — DISCHARGE INSTRUCTIONS
Keep your wound clean and dry in ER dressing for 2 days. Then, clean daily with soap and water. Neosporin and bandage daily. Keep your hand elevated. Cuts on the Hand Closed With Stitches: Care Instructions  Your Care Instructions    A cut on your hand can be on your fingers, your thumb, or the front or back of your hand. Sometimes a cut can injure the tendons, blood vessels, or nerves of your hand. The doctor used stitches to close the cut. Using stitches also helps the cut heal and reduces scarring. The doctor may have given you a splint to help prevent you from moving your hand, fingers, or thumb. If the cut went deep and through the skin, the doctor put in two layers of stitches. The deeper layer brings the deep part of the cut together. These stitches will dissolve and don't need to be removed. The stitches in the upper layer are the ones you see on the cut. You will probably have a bandage. You will need to have the stitches removed, usually in 7 to 14 days. The doctor may suggest that you see a hand specialist if the cut is very deep or if you have trouble moving your fingers or have less feeling in your hand. The doctor has checked you carefully, but problems can develop later. If you notice any problems or new symptoms, get medical treatment right away. Follow-up care is a key part of your treatment and safety. Be sure to make and go to all appointments, and call your doctor if you are having problems. It's also a good idea to know your test results and keep a list of the medicines you take. How can you care for yourself at home? · Keep the cut dry for the first 24 to 48 hours. After this, you can shower if your doctor okays it. Pat the cut dry. · Don't soak the cut, such as in a bathtub. Your doctor will tell you when it's safe to get the cut wet. · If your doctor told you how to care for your cut, follow your doctor's instructions.  If you did not get instructions, follow this general advice:  ? After the first 24 to 48 hours, wash around the cut with clean water 2 times a day. Don't use hydrogen peroxide or alcohol, which can slow healing. ? You may cover the cut with a thin layer of petroleum jelly, such as Vaseline, and a nonstick bandage. ? Apply more petroleum jelly and replace the bandage as needed. · Prop up the sore hand on a pillow anytime you sit or lie down during the next 3 days. Try to keep it above the level of your heart. This will help reduce swelling. · Avoid any activity that could cause your cut to reopen. · Do not remove the stitches on your own. Your doctor will tell you when to come back to have the stitches removed. · Be safe with medicines. Take pain medicines exactly as directed. ? If the doctor gave you a prescription medicine for pain, take it as prescribed. ? If you are not taking a prescription pain medicine, ask your doctor if you can take an over-the-counter medicine. When should you call for help? Call your doctor now or seek immediate medical care if:    · You have new pain, or your pain gets worse.     · The skin near the cut is cold or pale or changes color.     · You have tingling, weakness, or numbness near the cut.     · The cut starts to bleed, and blood soaks through the bandage. Oozing small amounts of blood is normal.     · You have trouble moving the area of the hand near the cut.     · You have symptoms of infection, such as:  ? Increased pain, swelling, warmth, or redness around the cut.  ? Red streaks leading from the cut.  ? Pus draining from the cut.  ? A fever.    Watch closely for changes in your health, and be sure to contact your doctor if:    · You do not get better as expected. Where can you learn more? Go to http://harish-kathleen.info/. Enter T250 in the search box to learn more about \"Cuts on the Hand Closed With Stitches: Care Instructions. \"  Current as of: November 20, 2017  Content Version: 11.8  © 5010-2306 [employer]. Care instructions adapted under license by [employer] (which disclaims liability or warranty for this information). If you have questions about a medical condition or this instruction, always ask your healthcare professional. [place] [address] any warranty or liability for your use of this information.

## 2018-11-20 NOTE — ED TRIAGE NOTES
Patient was using a saw and lacerated his right thumb. Bleeding controlled on arrival. Tetanus updated last year. No other injuries.

## 2018-11-20 NOTE — ED PROVIDER NOTES
R handed pt here with R thumb injury from a Sawzall Tried irrigating it TD last year Severe pain On meds for CVA last year Not a diabetic. 
 
10:01 AM 
I have evaluated the patient as the Provider in Triage. I have reviewed His vital signs and the triage nurse assessment. I have talked with the patient and any available family and advised that I am the provider in triage and have ordered the appropriate study to initiate their work up based on the clinical presentation during my assessment. I have advised that the patient will be accommodated in the Main ED as soon as possible. I have also requested to contact the triage nurse or myself immediately if the patient experiences any changes in their condition during this brief waiting period. Tejinder Thomason, DO 
 
 
72 y.o. male with past medical history significant for hypertension, sleep apnea, stroke, and skin cancer who presents from home with chief complaint of finger pain. Pt states that he was cutting a fence with a Sawzall saw when it slipped from his hands and cut his right thumb. He denies any other injury. Pt was R-hand dominant but reports some right sided weakness secondary to a stroke 1 year ago. He takes anticoagulants daily. His last tetanus was last year. Pt denies any numbness. There are no other acute medical concerns at this time. Social hx: former tobacco smoker; (+) rare EtOH use PCP: Kimmy Garcia MD 
 
Note written by Lance Israel, as dictated by Sean Berry PA-C 10:39 AM 
 
 
The history is provided by the patient and a relative (son). No  was used. Past Medical History:  
Diagnosis Date  Family history of skin cancer  Hypertension  Skin cancer Squamous skin cancer on top of head  Stroke [facility]) 2008 TIA  Sun-damaged skin  Sunburn, blistering  Unspecified sleep apnea   
 cpap Past Surgical History:  
Procedure Laterality Date  HX KNEE ARTHROSCOPY Bilateral 2011 [facility]  2004 TRIGGER FINGER-R HAND  HX ORTHOPAEDIC    
 finger - left Family History:  
Problem Relation Age of Onset  Diabetes Mother  Cancer Mother LUNG  
 Heart Disease Father  Cancer Sister PANCREATIC  Cancer Sister BRAIN  
 Malignant Hyperthermia Neg Hx  Pseudocholinesterase Deficiency Neg Hx  Delayed Awakening Neg Hx  Post-op Nausea/Vomiting Neg Hx  Emergence Delirium Neg Hx  Post-op Cognitive Dysfunction Neg Hx   
 Other Neg Hx Social History Socioeconomic History  Marital status:  Spouse name: Not on file  Number of children: Not on file  Years of education: Not on file  Highest education level: Not on file Social Needs  Financial resource strain: Not on file  Food insecurity - worry: Not on file  Food insecurity - inability: Not on file  Transportation needs - medical: Not on file  Transportation needs - non-medical: Not on file Occupational History  Not on file Tobacco Use  Smoking status: Former Smoker  Smokeless tobacco: Never Used Substance and Sexual Activity  Alcohol use: Yes Alcohol/week: 1.5 oz Types: 3 Glasses of wine per week Comment: Rare  Drug use: No  
 Sexual activity: Not on file Other Topics Concern  Not on file Social History Narrative ** Merged History Encounter ** ALLERGIES: Codeine Review of Systems Constitutional: Negative for appetite change, chills, fatigue and fever. HENT: Negative for congestion, ear pain, postnasal drip, rhinorrhea and sore throat. Eyes: Negative for visual disturbance. Respiratory: Negative for cough, shortness of breath and wheezing. Cardiovascular: Negative for chest pain, palpitations and leg swelling. Gastrointestinal: Negative for abdominal pain, anal bleeding, constipation, diarrhea, nausea and vomiting. Genitourinary: Negative for dysuria and hematuria. Musculoskeletal: Negative for arthralgias and myalgias. Skin: Positive for wound (right thumb). Negative for rash. Allergic/Immunologic: Negative for immunocompromised state. Neurological: Negative for weakness, light-headedness and headaches. Vitals:  
 11/20/18 5250 BP: 169/81 Pulse: 73 Resp: 16 Temp: 97.7 °F (36.5 °C) SpO2: 96% Weight: 108.9 kg (240 lb) Height: 6' 1\" (1.854 m) Physical Exam  
Constitutional: He is oriented to person, place, and time. He appears well-developed and well-nourished. No distress. HENT:  
Head: Normocephalic and atraumatic. Right Ear: External ear normal.  
Left Ear: External ear normal.  
Eyes: EOM are normal. Pupils are equal, round, and reactive to light. Neck: Neck supple. Cardiovascular: Normal rate, regular rhythm, normal heart sounds and intact distal pulses. Exam reveals no gallop and no friction rub. No murmur heard. Pulmonary/Chest: Effort normal and breath sounds normal. No stridor. No respiratory distress. He has no wheezes. He has no rales. He exhibits no tenderness. Musculoskeletal: Normal range of motion. He exhibits tenderness. He exhibits no edema or deformity. Right thumb with 1.5 cm flap lac on radial aspect along edge of nail bed without nail bed involvement. No fb. No tendon ROM limited at baseline secondary to CVA. + able to flex, difficulty with extension. , distal n/ v intact. Cap refill brisk. Neurological: He is alert and oriented to person, place, and time. No cranial nerve deficit. Coordination normal.  
Skin: Skin is warm and dry. Capillary refill takes less than 2 seconds. No rash noted. No erythema. No pallor. Psychiatric: He has a normal mood and affect. His behavior is normal.  
Nursing note and vitals reviewed. MDM Number of Diagnoses or Management Options Amount and/or Complexity of Data Reviewed Tests in the radiology section of CPT®: ordered and reviewed Obtain history from someone other than the patient: yes (son) Review and summarize past medical records: yes Independent visualization of images, tracings, or specimens: yes Patient Progress Patient progress: stable Procedures 10:24 AM 
Discussed pt, sx, hx and current findings with Gabby Hodge DO. He is in agreement with plan. Will get xray, suture wound Kiesha Garcia PA-C Procedure Note - Laceration Repair: 
11:24 AM 
Procedure by Kiesha Garcia PA-C. Complexity: complex 1.5 cm v-shaped laceration to right thumb was irrigated copiously with NS under jet lavage, prepped with surecleans and draped in a sterile fashion. The area was anesthetized with 5 mLs of  Lidocaine 1% without epinephrine via local infiltration. The wound was explored with the following results: No foreign bodies found, No tendon laceration seen. The wound was repaired with One layer suture closure: Skin Layer:  4 sutures placed, stitch type:simple interrupted, suture: 4-0 nylon. .  The wound was closed with good hemostasis and approximation. Sterile dressing applied. Estimated blood loss: less than 2mL The procedure took 18 minutes, and pt tolerated well 
Kiesha Mehta. BORIS Garcia 
 
LABORATORY TESTS: 
No results found for this or any previous visit (from the past 12 hour(s)). IMAGING RESULTS: 
 
Xr Thumb Rt Min 2 V Result Date: 11/20/2018 EXAM:  XR THUMB RT MIN 2 V INDICATION: Right thumb pain after trauma COMPARISON: None. FINDINGS: Three views of the right thumb demonstrate no fracture or other acute osseous or articular abnormality. 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 IMPRESSION:   No acute abnormality.    
 
 
MEDICATIONS GIVEN: 
Medications  
bacitracin 500 unit/gram packet 1 Packet (1 Packet Topical Given 11/20/18 1127)  
lidocaine (PF) (XYLOCAINE) 20 mg/mL (2 %) injection 6 mg (6 mg SubCUTAneous Given by Provider 11/20/18 1041) lidocaine (PF) (XYLOCAINE) 10 mg/mL (1 %) injection 1 mL (1 mL SubCUTAneous Given by Provider 11/20/18 1120) IMPRESSION: 
1. Laceration of right thumb without foreign body without damage to nail, initial encounter PLAN: 
1. Discharge Medication List as of 11/20/2018 11:27 AM  
  
START taking these medications Details  
cephALEXin (KEFLEX) 500 mg capsule Take 1 Cap by mouth three (3) times daily. , Print, Disp-15 Cap, R-0  
  
  
CONTINUE these medications which have NOT CHANGED Details  
glucosamine sulfate 500 mg capsule Take  by mouth three (3) times daily. , Historical Med  
  
atorvastatin (LIPITOR) 20 mg tablet Take 1 Tab by mouth nightly., No Print, Disp-30 Tab, R-0  
  
clopidogrel (PLAVIX) 75 mg tab Take 1 Tab by mouth daily. , No Print, Disp-30 Tab, R-0  
  
hydroCHLOROthiazide (HYDRODIURIL) 12.5 mg tablet Take 1 Tab by mouth daily. , No Print, Disp-30 Tab, R-0  
  
verapamil ER (CALAN-SR) 240 mg CR tablet Take 1 Tab by mouth daily. , No Print, Disp-30 Tab, R-0  
  
lisinopril (PRINIVIL, ZESTRIL) 20 mg tablet Take 1 Tab by mouth daily. , No Print, Disp-30 Tab, R-0  
  
melatonin 3 mg tablet Take 1 Tab by mouth nightly as needed., No Print, Disp-30 Tab, R-0  
  
cholecalciferol, vitamin D3, (VITAMIN D3) 2,000 unit tab Take 1 Tab by mouth daily. , Historical Med  
  
fluticasone (FLONASE) 50 mcg/actuation nasal spray 2 Sprays by Both Nostrils route daily. , Historical Med  
  
sertraline (ZOLOFT) 100 mg tablet Take 100 mg by mouth daily. , Historical Med  
  
verapamil CR (VERELAN) 180 mg CR capsule Take 180 mg by mouth daily. , Historical Med 2. Follow-up Information Follow up With Specialties Details Why Contact Edgar Membreno MD Family Practice Schedule an appointment as soon as possible for a visit 7 days for recheck and suture removal  [address] [address] [id number] [phone] Return to ED if worse 11:26 AM 
Pt has been reexamined. Pt has no new complaints, changes or physical findings. Care plan outlined and precautions discussed. All available results were reviewed with pt. All medications were reviewed with pt. All of pt's questions and concerns were addressed. Pt agrees to F/U as instructed and agrees to return to ED upon further deterioration. Pt is ready to go home. JESSIE Quiroz

## 2018-11-20 NOTE — ED NOTES
JESSIE Serrano has reviewed discharge instructions with patient and family including prescription(s) if applicable. Patient has received and verbalizes understanding of all instructions and has no further questions at this time. Patient exits ED via ambulatory accompanied by family. Patient in no acute distress.

## 2020-10-05 ENCOUNTER — TRANSCRIBE ORDER (OUTPATIENT)
Dept: SCHEDULING | Age: 75
End: 2020-10-05

## 2020-10-05 DIAGNOSIS — Z86.73 HISTORY OF CVA (CEREBROVASCULAR ACCIDENT): Primary | ICD-10-CM

## 2020-10-09 ENCOUNTER — TRANSCRIBE ORDER (OUTPATIENT)
Dept: SCHEDULING | Age: 75
End: 2020-10-09

## 2020-10-09 DIAGNOSIS — T17.308D CHOKING, SUBSEQUENT ENCOUNTER: Primary | ICD-10-CM

## 2020-10-09 DIAGNOSIS — T17.308D: Primary | ICD-10-CM

## 2021-11-03 NOTE — PERIOP NOTES
Called home and mobile: No name stated on voicemail; Left generalized message regarding COVID requirements, a COVID test needs to be completed in order to proceed with procedures at [facility]. Left message regarding curbside COVID swabbing at [facility] Wednesday, November 10th from [phone]. Call [facility] at [phone] Monday thru Friday with questions or concerns.

## 2021-11-04 NOTE — PERIOP NOTES
Called home and mobile: No name stated on voicemail; Left generalized message regarding COVID requirements, a COVID test needs to be completed in order to proceed with procedures at [address] [place] [address]. Left message regarding curbside COVID swabbing at [address] [place] [address] Wednesday, November 10th from [phone]. Call [address] [place] [address] Endoscopy at [phone] Monday thru Friday with questions or concerns.

## 2021-11-09 ENCOUNTER — HOSPITAL ENCOUNTER (OUTPATIENT)
Dept: LAB | Age: 76
Discharge: HOME | End: 2021-11-09
Payer: COMMERCIAL

## 2021-11-09 ENCOUNTER — TRANSCRIBE ORDER (OUTPATIENT)
Dept: REGISTRATION | Age: 76
End: 2021-11-09

## 2021-11-09 DIAGNOSIS — Z01.812 PRE-PROCEDURAL LABORATORY EXAMINATIONS: ICD-10-CM

## 2021-11-09 DIAGNOSIS — Z01.812 PRE-PROCEDURAL LABORATORY EXAMINATIONS: Primary | ICD-10-CM

## 2021-11-09 PROCEDURE — U0005 INFEC AGEN DETEC AMPLI PROBE: HCPCS

## 2021-11-10 LAB
SARS-COV-2, XPLCVT: NOT DETECTED
SOURCE, COVRS: NORMAL

## 2021-11-12 ENCOUNTER — HOSPITAL ENCOUNTER (OUTPATIENT)
Age: 76
Setting detail: OUTPATIENT SURGERY
Discharge: HOME | End: 2021-11-12
Attending: INTERNAL MEDICINE | Admitting: INTERNAL MEDICINE
Payer: COMMERCIAL

## 2021-11-12 ENCOUNTER — ANESTHESIA (OUTPATIENT)
Dept: ENDOSCOPY | Age: 76
End: 2021-11-12
Payer: COMMERCIAL

## 2021-11-12 ENCOUNTER — ANESTHESIA EVENT (OUTPATIENT)
Dept: ENDOSCOPY | Age: 76
End: 2021-11-12
Payer: COMMERCIAL

## 2021-11-12 VITALS
HEART RATE: 81 BPM | RESPIRATION RATE: 17 BRPM | HEIGHT: 73 IN | SYSTOLIC BLOOD PRESSURE: 165 MMHG | WEIGHT: 224.21 LBS | BODY MASS INDEX: 29.72 KG/M2 | TEMPERATURE: 97.7 F | OXYGEN SATURATION: 97 % | DIASTOLIC BLOOD PRESSURE: 95 MMHG

## 2021-11-12 LAB
GLUCOSE BLD STRIP.AUTO-MCNC: 97 MG/DL (ref 65–117)
SERVICE CMNT-IMP: NORMAL

## 2021-11-12 PROCEDURE — 74011250636 HC RX REV CODE- 250/636: Performed by: NURSE ANESTHETIST, CERTIFIED REGISTERED

## 2021-11-12 PROCEDURE — 82962 GLUCOSE BLOOD TEST: CPT

## 2021-11-12 PROCEDURE — 88305 TISSUE EXAM BY PATHOLOGIST: CPT

## 2021-11-12 PROCEDURE — 76040000007: Performed by: INTERNAL MEDICINE

## 2021-11-12 PROCEDURE — 2709999900 HC NON-CHARGEABLE SUPPLY: Performed by: INTERNAL MEDICINE

## 2021-11-12 PROCEDURE — 77030013992 HC SNR POLYP ENDOSC BSC -B: Performed by: INTERNAL MEDICINE

## 2021-11-12 PROCEDURE — 76060000032 HC ANESTHESIA 0.5 TO 1 HR: Performed by: INTERNAL MEDICINE

## 2021-11-12 RX ORDER — SODIUM CHLORIDE 9 MG/ML
INJECTION, SOLUTION INTRAVENOUS
Status: DISCONTINUED | OUTPATIENT
Start: 2021-11-12 | End: 2021-11-12 | Stop reason: HOSPADM

## 2021-11-12 RX ORDER — EPINEPHRINE 0.1 MG/ML
1 INJECTION INTRACARDIAC; INTRAVENOUS
Status: DISCONTINUED | OUTPATIENT
Start: 2021-11-12 | End: 2021-11-12 | Stop reason: HOSPADM

## 2021-11-12 RX ORDER — NALOXONE HYDROCHLORIDE 0.4 MG/ML
0.4 INJECTION, SOLUTION INTRAMUSCULAR; INTRAVENOUS; SUBCUTANEOUS
Status: DISCONTINUED | OUTPATIENT
Start: 2021-11-12 | End: 2021-11-12 | Stop reason: HOSPADM

## 2021-11-12 RX ORDER — PROPOFOL 10 MG/ML
INJECTION, EMULSION INTRAVENOUS AS NEEDED
Status: DISCONTINUED | OUTPATIENT
Start: 2021-11-12 | End: 2021-11-12 | Stop reason: HOSPADM

## 2021-11-12 RX ORDER — PROPOFOL 10 MG/ML
INJECTION, EMULSION INTRAVENOUS
Status: DISCONTINUED | OUTPATIENT
Start: 2021-11-12 | End: 2021-11-12 | Stop reason: HOSPADM

## 2021-11-12 RX ORDER — MIDAZOLAM HYDROCHLORIDE 1 MG/ML
.25-5 INJECTION, SOLUTION INTRAMUSCULAR; INTRAVENOUS
Status: DISCONTINUED | OUTPATIENT
Start: 2021-11-12 | End: 2021-11-12 | Stop reason: HOSPADM

## 2021-11-12 RX ORDER — ATROPINE SULFATE 0.1 MG/ML
0.4 INJECTION INTRAVENOUS
Status: DISCONTINUED | OUTPATIENT
Start: 2021-11-12 | End: 2021-11-12 | Stop reason: HOSPADM

## 2021-11-12 RX ORDER — DEXTROMETHORPHAN/PSEUDOEPHED 2.5-7.5/.8
1.2 DROPS ORAL
Status: DISCONTINUED | OUTPATIENT
Start: 2021-11-12 | End: 2021-11-12 | Stop reason: HOSPADM

## 2021-11-12 RX ORDER — FLUMAZENIL 0.1 MG/ML
0.2 INJECTION INTRAVENOUS
Status: DISCONTINUED | OUTPATIENT
Start: 2021-11-12 | End: 2021-11-12 | Stop reason: HOSPADM

## 2021-11-12 RX ORDER — SODIUM CHLORIDE 9 MG/ML
50 INJECTION, SOLUTION INTRAVENOUS CONTINUOUS
Status: DISCONTINUED | OUTPATIENT
Start: 2021-11-12 | End: 2021-11-12 | Stop reason: HOSPADM

## 2021-11-12 RX ADMIN — PROPOFOL 150 MCG/KG/MIN: 10 INJECTION, EMULSION INTRAVENOUS at 08:44

## 2021-11-12 RX ADMIN — SODIUM CHLORIDE: 9 INJECTION, SOLUTION INTRAVENOUS at 08:33

## 2021-11-12 RX ADMIN — PROPOFOL 100 MG: 10 INJECTION, EMULSION INTRAVENOUS at 08:44

## 2021-11-12 RX ADMIN — PROPOFOL 20 MG: 10 INJECTION, EMULSION INTRAVENOUS at 08:46

## 2021-11-12 NOTE — ANESTHESIA PREPROCEDURE EVALUATION
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       Anesthetic History   No history of anesthetic complications            Review of Systems / Medical History  Patient summary reviewed, nursing notes reviewed and pertinent labs reviewed    Pulmonary        Sleep apnea           Neuro/Psych         TIA     Cardiovascular    Hypertension        Dysrhythmias : atrial fibrillation      Exercise tolerance: >4 METS     GI/Hepatic/Renal  Within defined limits              Endo/Other    Diabetes         Other Findings              Physical Exam    Airway  Mallampati: II    Neck ROM: normal range of motion   Mouth opening: Normal     Cardiovascular  Regular rate and rhythm,  S1 and S2 normal,  no murmur, click, rub, or gallop  Rhythm: regular  Rate: normal         Dental  No notable dental hx       Pulmonary  Breath sounds clear to auscultation               Abdominal  GI exam deferred       Other Findings            Anesthetic Plan    ASA: 3  Anesthesia type: MAC          Induction: Intravenous  Anesthetic plan and risks discussed with: Patient

## 2021-11-12 NOTE — PROCEDURES
Jonny Donovan M.D.  [phone]            2021          Colonoscopy Operative Report  Gisela Ramires  :  1945  [place] Medical Record Number:  [id number]      Indications:    Personal history of colon polyps (screening only)     :  Dalton French MD    Referring Provider: Jamal Bassett MD    Sedation:  MAC anesthesia    Pre-Procedural Exam:      Airway: clear,  No airway problems anticipated  Heart: RRR, without gallops or rubs  Lungs: clear bilaterally without wheezes, crackles, or rhonchi  Abdomen: soft, nontender, nondistended, bowel sounds present  Mental Status: awake, alert and oriented to person, place and time     Procedure Details:  After informed consent was obtained with all risks and benefits of procedure explained and preoperative exam completed, the patient was taken to the endoscopy suite and placed in the left lateral decubitus position. Upon sequential sedation as per above, a digital rectal exam was performed. The Olympus videocolonoscope  was inserted in the rectum and carefully advanced to the cecum, which was identified by the ileocecal valve and appendiceal orifice. The quality of preparation was good. The colonoscope was slowly withdrawn with careful inspection and evaluation between folds. Retroflexion in the rectum was performed. Findings:   Terminal Ileum: not intubated  Cecum: normal  Ascending Colon: normal  Transverse Colon: 2  Pedunculated polyp(s), the largest 5 mm in size; Descending Colon: normal  Sigmoid: normal  Rectum: no mucosal lesion appreciated  Grade 1 internal hemorrhoid(s); Interventions:  2 complete polypectomy were performed using cold snare  and the polyps were  retrieved    Specimen Removed:  specimen #1, 3 and 5 mm in size, located in the transverse colon removed by cold snare and retrieved for pathology    Complications: None. EBL:  None.     Impression:  Two small polyps were removed and sent to pathology, otherwise mucosa within normal                          Small internal hemorrhoids    Recommendations:  -Repeat colonoscopy in 3 to 5 years.   -High fiber diet.    -Resume normal medication(s). Discharge Disposition:  Home in the company of a  when able to ambulate.     Radhames Ramey MD  11/12/2021  9:18 AM

## 2021-11-12 NOTE — PROGRESS NOTES
Endoscopy discharge instructions have been reviewed and given to patient. The patient verbalized understanding and acceptance of instructions. Dr. Lisa Roche discussed with patient procedure findings and next steps. Glasses returned to patient.

## 2021-11-12 NOTE — DISCHARGE INSTRUCTIONS
[facility] [facility]. Lauren Gonzalez M.D.  [phone]            COLON DISCHARGE INSTRUCTIONS       2021    [facility]  :  1945  [place] Medical Record Number:  [id number]      COLONOSCOPY FINDINGS:  Your colonoscopy showed two small polyps in the transverse colon. DISCOMFORT:  Redness at IV site- apply warm compress to area; if redness or soreness persist- contact your physician  There may be a slight amount of blood passed from the rectum  Gaseous discomfort- walking, belching will help relieve any discomfort  You may not operate a vehicle for 12 hours  You may not engage in an occupation involving machinery or appliances for rest of today  You may not drink alcoholic beverages for at least 12 hours  Avoid making any critical decisions for at least 24 hour  DIET:   High fiber diet. - however -  remember your colon is empty and a heavy meal will produce gas. Avoid these foods:  vegetables, fried / greasy foods, carbonated drinks for today     ACTIVITY:  You may resume your normal daily activities it is recommended that you spend the remainder of the day resting -  avoid any strenuous activity. CALL M.D. ANY SIGN OF:   Increasing pain, nausea, vomiting  Abdominal distension (swelling)  New increased bleeding (oral or rectal)  Fever (chills)  Pain in chest area  Bloody discharge from nose or mouth   Shortness of breath    Follow-up Instructions:   Call Dr. Virginia Batista if any questions or problems. Telephone # [phone]  Biopsy results will be available in  5 to 7 days  Should have a repeat colonoscopy in 3 to 5 years.

## 2021-11-12 NOTE — ANESTHESIA POSTPROCEDURE EVALUATION
Procedure(s):  COLONOSCOPY  ENDOSCOPIC POLYPECTOMY. MAC    Anesthesia Post Evaluation      Multimodal analgesia: multimodal analgesia not used between 6 hours prior to anesthesia start to PACU discharge  Patient location during evaluation: PACU  Patient participation: complete - patient participated  Level of consciousness: awake  Pain management: adequate  Airway patency: patent  Anesthetic complications: no  Cardiovascular status: acceptable, blood pressure returned to baseline and hemodynamically stable  Respiratory status: acceptable  Hydration status: acceptable  Post anesthesia nausea and vomiting:  controlled      INITIAL Post-op Vital signs:   Vitals Value Taken Time   /91 11/12/21 0927   Temp     Pulse 86 11/12/21 0928   Resp 17 11/12/21 0928   SpO2 97 % 11/12/21 0928   Vitals shown include unvalidated device data.

## 2021-11-12 NOTE — H&P
The patient is a 70year old male who presents for a screening colonscopy. The patient presents for a screening colonoscopy evaluation (Pt presents for screening colonnosocpy visit. Benito Guaman reports he has suffered a CVA since he was seen last.  Reports he has recovered well though he does have some dysarthria and right sided weakness.  He reports he has been having some constipation. Benito Guaman has a bowel movement about every three to five days. Kike Talley has been no blood in the stools or abdominal pain.  He has no complaints today. ). The patient had a colonoscopy 6 year(s) ago. Problem List/Past Medical (Laureen TYESHAMartha Marcel Kailyn; 3/22/2021 8:18 AM)  Stroke (I63.9)    Diabetes Mellitus, Type II    Hypertension    History of colonic polyps (Z86.010)      Past Surgical History (Laureen TYESHAMartha Marcel Salazarer; 3/22/2021 8:18 AM)  Non-Contributory Past Surgical History      Allergies (Laureen TYESHAMartha Marcel Salazarer; 3/22/2021 8:18 AM)  No Known Allergies   [12/02/2020]:  No Known Drug Allergies   [12/02/2020]:    Medication History (Laureen TYESHAMartha Marcel Salazarer; 3/22/2021 8:19 AM)  Glucosamine  (1 tab Oral daily) Specific strength unknown - Active. Flonase  (50MCG/ACT Suspension, Nasal PRN) Active. Zestril  (2.5MG Tablet, 1 tab Oral daily) Active. Verapamil HCl  (120MG Tablet, 2 tab Oral daily) Active. Zoloft  (100MG Tablet, 1 tab Oral daily) Active. Plavix  (75MG Tablet, 1 tab Oral daily) Active. Lipitor  (20MG Tablet, 1 tab Oral daily) Active. hydroCHLOROthiazide  (12.5MG Tablet, 1 tab Oral daily) Active. metFORMIN HCl  (500MG Tablet, 2 tab Oral every evening) Active. Medications Reconciled     Family History (Malka Laurent. Marcelpaul Avina; 3/22/2021 8:19 AM)  Non-Contributory Family History      Social History (Malka Laurent. Marcel Kailyn; 3/22/2021 8:18 AM)  Blood Transfusion   No.  Alcohol Use   Has never drank. Tobacco Use   Never smoker. Employment status   Retired. Marital status   . Diagnostic Studies History (Laureen Avina; 3/22/2021 8:18 AM)  Colonoscopy   [08/06/2015]: Health Maintenance History (Stephanie Parrish; 3/22/2021 8:18 AM)  Flu Vaccine   [10/2020]:  Pneumovax   [2019]:        Review of Systems (Stephanie Parrish; 3/22/2021 8:18 AM)  General Not Present- Chronic Fatigue, Poor Appetite, Weight Gain and Weight Loss. Skin Not Present- Itching, Rash and Skin Color Changes. HEENT Not Present- Hearing Loss and Vertigo. Respiratory Not Present- Difficulty Breathing and TB exposure. Cardiovascular Not Present- Chest Pain, Use of Antibiotics before Dental Procedures and Use of Blood Thinners. Gastrointestinal Present- See HPI. Musculoskeletal Not Present- Arthritis, Hip Replacement Surgery and Knee Replacement Surgery. Neurological Not Present- Weakness. Psychiatric Not Present- Depression. Endocrine Not Present- Diabetes and Thyroid Problems. Hematology Not Present- Anemia. Vitals (Laureen Parrish; 3/22/2021 8:20 AM)  3/22/2021 8:20 AM  Weight: 238 lb   Height: 73 in   Body Surface Area: 2.32 m²   Body Mass Index: 31.4 kg/m²                Assessment & Plan (Mike Valencia AGACNP; 3/22/2021 8:53 AM)  Screening for colon cancer (Z12.11)  Story: pt presents for screening colonsocopy visit. Pt has suffered a CVA since he was seen last. He reports he has recovered well though he does have some dysarthria and right sided weakness. He has been having some constipation and has been using stool softeners PRN. Impression: Discussed plan to proceed with screening colonoscopy. Advised him to hold plavix x 5 days prior to his procedure. Will notify Dr. Roque Suárez we are doing so. For his constipation I recommended a daily bowel regimen of miralax. The duration of our call was 8 minutes.   Current Plans  Colonoscopy (03867) (Discussed risks and benefits with the patient to include:; perforation, post polypectomy, or post biopsy bleeding, missed lesions, and sedation reactions.)  Started Suprep Bowel Prep Kit 17.5-3.13-1.6GM/177ML, 1 (one) KIt container Milliliter as directed, 354 Milliliter, 03/22/2021, No Refill. Due to this being a TeleHealth Phone Call encounter (During [facility] public health emergency), evaluation of the following organ systems was limited: Vitals/Constitutional/EENT/Resp/CV/GI//MS/Neuro/Skin/Heme-Lymph-Imm. Pursuant to the emergency declaration under the [address] [place] [address] and the [employer] Act, this Phone Call Visit was conducted with patient's (and/or legal guardian's) consent, to reduce the risk of exposure to COVID-19 and provide necessary medical care. Services were provided through a phone call discussion to substitute for in-person encounter.     Signed by ITZEL Roth (3/22/2021 9:29 AM)

## 2021-11-12 NOTE — PROGRESS NOTES
Sandra Dee  1945  [id number]    Situation:  Verbal report received from: Dipak Maldonado RN   Procedure: Procedure(s):  COLONOSCOPY  ENDOSCOPIC POLYPECTOMY    Background:    Preoperative diagnosis: SCREENING  Postoperative diagnosis: Polyp    :  Dr. Buddy Smith  Assistant(s): Endoscopy Technician-1: Nicole Garcia  Endoscopy RN-1: Wendy Antoine RN    Specimens:   ID Type Source Tests Collected by Time Destination   1 : polyp Preservative Colon, Transverse  Aiden Lomeli MD 11/12/2021 3207 Pathology     H. Pylori  no    Assessment:  Intra-procedure medications   Anesthesia gave intra-procedure sedation and medications, see anesthesia flow sheet yes    Intravenous fluids: NS@ KVO     Vital signs stable yes      Abdominal assessment: round and soft yes    Recommendation:  Discharge patient per MD order yes.   Return to floor na  Family or Friend family  Permission to share finding with family or friend yes

## 2022-03-19 PROBLEM — I63.9 ACUTE CVA (CEREBROVASCULAR ACCIDENT) (HCC): Status: ACTIVE | Noted: 2017-08-03

## 2022-03-19 PROBLEM — F32.A DEPRESSION: Status: ACTIVE | Noted: 2017-08-03

## 2022-03-19 PROBLEM — I10 HYPERTENSION: Status: ACTIVE | Noted: 2017-08-03

## 2024-10-15 ENCOUNTER — APPOINTMENT (OUTPATIENT)
Facility: HOSPITAL | Age: 79
End: 2024-10-15
Payer: MEDICARE

## 2024-10-15 ENCOUNTER — HOSPITAL ENCOUNTER (EMERGENCY)
Facility: HOSPITAL | Age: 79
Discharge: HOME | End: 2024-10-15
Attending: EMERGENCY MEDICINE
Payer: MEDICARE

## 2024-10-15 VITALS
WEIGHT: 239.4 LBS | SYSTOLIC BLOOD PRESSURE: 199 MMHG | HEIGHT: 72 IN | HEART RATE: 116 BPM | RESPIRATION RATE: 18 BRPM | DIASTOLIC BLOOD PRESSURE: 98 MMHG | OXYGEN SATURATION: 96 % | TEMPERATURE: 98.2 F | BODY MASS INDEX: 32.43 KG/M2

## 2024-10-15 DIAGNOSIS — S00.83XA CONTUSION OF FACE, INITIAL ENCOUNTER: ICD-10-CM

## 2024-10-15 DIAGNOSIS — R04.0 EPISTAXIS: Primary | ICD-10-CM

## 2024-10-15 LAB
ALBUMIN SERPL-MCNC: 3.7 G/DL (ref 3.5–5)
ALBUMIN/GLOB SERPL: 1.2 (ref 1.1–2.2)
ALP SERPL-CCNC: 78 U/L (ref 45–117)
ALT SERPL-CCNC: 54 U/L (ref 12–78)
ANION GAP SERPL CALC-SCNC: 2 MMOL/L (ref 2–12)
AST SERPL-CCNC: 29 U/L (ref 15–37)
BASOPHILS # BLD: 0 K/UL (ref 0–0.1)
BASOPHILS NFR BLD: 1 % (ref 0–1)
BILIRUB SERPL-MCNC: 0.5 MG/DL (ref 0.2–1)
BUN SERPL-MCNC: 19 MG/DL (ref 6–20)
BUN/CREAT SERPL: 19 (ref 12–20)
CALCIUM SERPL-MCNC: 9 MG/DL (ref 8.5–10.1)
CHLORIDE SERPL-SCNC: 108 MMOL/L (ref 97–108)
CO2 SERPL-SCNC: 30 MMOL/L (ref 21–32)
COMMENT:: NORMAL
CREAT SERPL-MCNC: 0.99 MG/DL (ref 0.7–1.3)
DIFFERENTIAL METHOD BLD: ABNORMAL
EOSINOPHIL # BLD: 0.1 K/UL (ref 0–0.4)
EOSINOPHIL NFR BLD: 1 % (ref 0–7)
ERYTHROCYTE [DISTWIDTH] IN BLOOD BY AUTOMATED COUNT: 16.6 % (ref 11.5–14.5)
GLOBULIN SER CALC-MCNC: 3.2 G/DL (ref 2–4)
GLUCOSE SERPL-MCNC: 148 MG/DL (ref 65–100)
HCT VFR BLD AUTO: 37 % (ref 36.6–50.3)
HGB BLD-MCNC: 11.8 G/DL (ref 12.1–17)
IMM GRANULOCYTES # BLD AUTO: 0 K/UL (ref 0–0.04)
IMM GRANULOCYTES NFR BLD AUTO: 0 % (ref 0–0.5)
LYMPHOCYTES # BLD: 0.9 K/UL (ref 0.8–3.5)
LYMPHOCYTES NFR BLD: 18 % (ref 12–49)
MCH RBC QN AUTO: 27.7 PG (ref 26–34)
MCHC RBC AUTO-ENTMCNC: 31.9 G/DL (ref 30–36.5)
MCV RBC AUTO: 86.9 FL (ref 80–99)
MONOCYTES # BLD: 0.4 K/UL (ref 0–1)
MONOCYTES NFR BLD: 8 % (ref 5–13)
NEUTS SEG # BLD: 3.7 K/UL (ref 1.8–8)
NEUTS SEG NFR BLD: 72 % (ref 32–75)
NRBC # BLD: 0 K/UL (ref 0–0.01)
NRBC BLD-RTO: 0 PER 100 WBC
PLATELET # BLD AUTO: 170 K/UL (ref 150–400)
PMV BLD AUTO: 10 FL (ref 8.9–12.9)
POTASSIUM SERPL-SCNC: 3.7 MMOL/L (ref 3.5–5.1)
PROT SERPL-MCNC: 6.9 G/DL (ref 6.4–8.2)
RBC # BLD AUTO: 4.26 M/UL (ref 4.1–5.7)
SODIUM SERPL-SCNC: 140 MMOL/L (ref 136–145)
SPECIMEN HOLD: NORMAL
WBC # BLD AUTO: 5.1 K/UL (ref 4.1–11.1)

## 2024-10-15 PROCEDURE — 85025 COMPLETE CBC W/AUTO DIFF WBC: CPT

## 2024-10-15 PROCEDURE — 80053 COMPREHEN METABOLIC PANEL: CPT

## 2024-10-15 PROCEDURE — 30903 CONTROL OF NOSEBLEED: CPT

## 2024-10-15 PROCEDURE — 99284 EMERGENCY DEPT VISIT MOD MDM: CPT

## 2024-10-15 PROCEDURE — 70486 CT MAXILLOFACIAL W/O DYE: CPT

## 2024-10-15 PROCEDURE — 36415 COLL VENOUS BLD VENIPUNCTURE: CPT

## 2024-10-15 PROCEDURE — 70450 CT HEAD/BRAIN W/O DYE: CPT

## 2024-10-15 PROCEDURE — 2500000003 HC RX 250 WO HCPCS

## 2024-10-15 RX ORDER — TRANEXAMIC ACID 100 MG/ML
INJECTION, SOLUTION INTRAVENOUS
Status: COMPLETED
Start: 2024-10-15 | End: 2024-10-15

## 2024-10-15 RX ADMIN — TRANEXAMIC ACID 1000 MG: 100 INJECTION, SOLUTION INTRAVENOUS at 05:38

## 2024-10-15 ASSESSMENT — PAIN SCALES - GENERAL: PAINLEVEL_OUTOF10: 5

## 2024-10-15 ASSESSMENT — PAIN DESCRIPTION - DESCRIPTORS: DESCRIPTORS: ACHING

## 2024-10-15 ASSESSMENT — PAIN DESCRIPTION - LOCATION: LOCATION: HEAD

## 2024-10-15 ASSESSMENT — PAIN - FUNCTIONAL ASSESSMENT: PAIN_FUNCTIONAL_ASSESSMENT: 0-10

## 2024-10-15 NOTE — ED PROVIDER NOTES
[facility] EMERGENCY DEPT  EMERGENCY DEPARTMENT ENCOUNTER      Pt Name: León Mata  MRN: [id number]  Birthdate 1945  Date of evaluation: 10/15/2024  Provider: Jose Bond DO    CHIEF COMPLAINT       Chief Complaint   Patient presents with    Epistaxis         HISTORY OF PRESENT ILLNESS   (Location/Symptom, Timing/Onset, Context/Setting, Quality, Duration, Modifying Factors, Severity)  Note limiting factors.   79-year-old male comes in for epistaxis.  About 9 days ago, patient had a fall where he hit his face.  He did not seek medical attention.  Ever since he has had intermittent nosebleeds.  He takes Plavix.  He stopped taking Plavix and then started taking it again and then stopped taking it.  Tonight he bent over to tie his shoes and had acute onset bleeding from his right nose.  He has been able to get it stopped and he came in to get it evaluated.  No other complaints            Review of External Medical Records:     Nursing Notes were reviewed.    REVIEW OF SYSTEMS    (2-9 systems for level 4, 10 or more for level 5)     Review of Systems    Except as noted above the remainder of the review of systems was reviewed and negative.       PAST MEDICAL HISTORY     Past Medical History:   Diagnosis Date    Arrhythmia     Diabetes (HCC)     Family history of skin cancer     Hypertension     Skin cancer     Squamous skin cancer on top of head    Stroke (HCC) 2017    TIA    Sun-damaged skin     Sunburn, blistering     Unspecified sleep apnea     cpap         SURGICAL HISTORY       Past Surgical History:   Procedure Laterality Date    COLONOSCOPY N/A 11/12/2021    COLONOSCOPY performed by Josh Rawls MD at [facility] ENDOSCOPY    KNEE ARTHROSCOPY Bilateral 2011    ORTHOPEDIC SURGERY  2004    TRIGGER FINGER-R HAND    ORTHOPEDIC SURGERY      finger - left          CURRENT MEDICATIONS       Previous Medications    No medications on file       ALLERGIES     Patient has no known allergies.    FAMILY HISTORY

## 2024-10-15 NOTE — ED TRIAGE NOTES
Patient to treatment area for nose bleed. Pt fell 9 days ago where intermittent nose bleeding started. Pt takes Eliquis, stopped taking 3 days ago in effort to stop bleeding.     Provider assessment at bedside

## (undated) DEVICE — ADULT SPO2 SENSOR: Brand: NELLCOR

## (undated) DEVICE — Device

## (undated) DEVICE — POLYP TRAP: Brand: TRAPEASE®

## (undated) DEVICE — KIT COLON W/ 1.1OZ LUB AND 2 END

## (undated) DEVICE — 1200 GUARD II KIT W/5MM TUBE W/O VAC TUBE: Brand: GUARDIAN

## (undated) DEVICE — CATH IV AUTOGRD BC BLU 22GA 25 -- INSYTE

## (undated) DEVICE — BASIN EMSIS 16OZ GRAPHITE PLAS KID SHP MOLD GRAD FOR ORAL

## (undated) DEVICE — SYR 3ML LL TIP 1/10ML GRAD --

## (undated) DEVICE — BAG BELONG PT PERS CLEAR HANDL

## (undated) DEVICE — BAG SPEC BIOHZRD 10 X 10 IN --

## (undated) DEVICE — NDL FLTR TIP 5 MIC 18GX1.5IN --

## (undated) DEVICE — NDL PRT INJ NSAF BLNT 18GX1.5 --

## (undated) DEVICE — ELECTRODE,RADIOTRANSLUCENT,FOAM,3PK: Brand: MEDLINE

## (undated) DEVICE — SET ADMIN 16ML TBNG L100IN 2 Y INJ SITE IV PIGGY BK DISP

## (undated) DEVICE — CONTAINER SPEC 20 ML LID NEUT BUFF FORMALIN 10 % POLYPR STS

## (undated) DEVICE — SNARE ENDOSCP M L240CM W27MM SHTH DIA2.4MM CHN 2.8MM OVL

## (undated) DEVICE — SOLIDIFIER MEDC 1200ML -- CONVERT TO 356117

## (undated) DEVICE — SYR 5ML 1/5 GRAD LL NSAF LF --